# Patient Record
Sex: MALE | Race: WHITE | NOT HISPANIC OR LATINO | Employment: FULL TIME | URBAN - METROPOLITAN AREA
[De-identification: names, ages, dates, MRNs, and addresses within clinical notes are randomized per-mention and may not be internally consistent; named-entity substitution may affect disease eponyms.]

---

## 2019-05-17 ENCOUNTER — OFFICE VISIT (OUTPATIENT)
Dept: INTERNAL MEDICINE | Facility: CLINIC | Age: 59
End: 2019-05-17

## 2019-05-17 ENCOUNTER — TELEPHONE (OUTPATIENT)
Dept: INTERNAL MEDICINE | Facility: CLINIC | Age: 59
End: 2019-05-17

## 2019-05-17 VITALS
RESPIRATION RATE: 18 BRPM | TEMPERATURE: 97.8 F | SYSTOLIC BLOOD PRESSURE: 150 MMHG | HEART RATE: 62 BPM | WEIGHT: 206.6 LBS | HEIGHT: 70 IN | DIASTOLIC BLOOD PRESSURE: 90 MMHG | BODY MASS INDEX: 29.58 KG/M2

## 2019-05-17 DIAGNOSIS — Z23 NEED FOR VACCINATION: ICD-10-CM

## 2019-05-17 DIAGNOSIS — R73.09 ELEVATED HEMOGLOBIN A1C: ICD-10-CM

## 2019-05-17 DIAGNOSIS — G89.29 CHRONIC NECK PAIN: ICD-10-CM

## 2019-05-17 DIAGNOSIS — Z12.11 ENCOUNTER FOR SCREENING COLONOSCOPY: ICD-10-CM

## 2019-05-17 DIAGNOSIS — Z11.59 NEED FOR HEPATITIS C SCREENING TEST: ICD-10-CM

## 2019-05-17 DIAGNOSIS — Z13.1 SCREENING FOR DIABETES MELLITUS: ICD-10-CM

## 2019-05-17 DIAGNOSIS — Z13.220 SCREENING FOR LIPOID DISORDERS: ICD-10-CM

## 2019-05-17 DIAGNOSIS — M54.2 CHRONIC NECK PAIN: ICD-10-CM

## 2019-05-17 DIAGNOSIS — I10 ESSENTIAL HYPERTENSION: Primary | ICD-10-CM

## 2019-05-17 DIAGNOSIS — R06.83 SNORING: ICD-10-CM

## 2019-05-17 LAB
A/C: NORMAL
ALBUMIN SERPL-MCNC: 4.4 G/DL (ref 3.5–5.2)
ALBUMIN/GLOB SERPL: 1.9 G/DL
ALP SERPL-CCNC: 62 U/L (ref 39–117)
ALT SERPL W P-5'-P-CCNC: 36 U/L (ref 1–41)
ANION GAP SERPL CALCULATED.3IONS-SCNC: 9.9 MMOL/L
AST SERPL-CCNC: 19 U/L (ref 1–40)
BASOPHILS # BLD AUTO: 0.03 10*3/MM3 (ref 0–0.2)
BASOPHILS NFR BLD AUTO: 0.5 % (ref 0–1.5)
BILIRUB BLD-MCNC: NEGATIVE MG/DL
BILIRUB SERPL-MCNC: 1.1 MG/DL (ref 0.2–1.2)
BUN BLD-MCNC: 12 MG/DL (ref 8–23)
BUN/CREAT SERPL: 11.8 (ref 7–25)
CALCIUM SPEC-SCNC: 9.4 MG/DL (ref 8.6–10.5)
CHLORIDE SERPL-SCNC: 100 MMOL/L (ref 98–107)
CHOLEST SERPL-MCNC: 186 MG/DL (ref 0–200)
CLARITY, POC: CLEAR
CO2 SERPL-SCNC: 28.1 MMOL/L (ref 22–29)
COLOR UR: YELLOW
CREAT BLD-MCNC: 1.02 MG/DL (ref 0.76–1.27)
DEPRECATED RDW RBC AUTO: 40.5 FL (ref 37–54)
EOSINOPHIL # BLD AUTO: 0.1 10*3/MM3 (ref 0–0.4)
EOSINOPHIL NFR BLD AUTO: 1.8 % (ref 0.3–6.2)
ERYTHROCYTE [DISTWIDTH] IN BLOOD BY AUTOMATED COUNT: 11.9 % (ref 12.3–15.4)
EXPIRATION DATE: ABNORMAL
EXPIRATION DATE: NORMAL
EXPIRATION DATE: NORMAL
GFR SERPL CREATININE-BSD FRML MDRD: 74 ML/MIN/1.73
GLOBULIN UR ELPH-MCNC: 2.3 GM/DL
GLUCOSE BLD-MCNC: 421 MG/DL (ref 65–99)
GLUCOSE UR STRIP-MCNC: ABNORMAL MG/DL
HBA1C MFR BLD: 10.9 %
HCT VFR BLD AUTO: 48.5 % (ref 37.5–51)
HCV AB SER DONR QL: NORMAL
HDLC SERPL-MCNC: 51 MG/DL (ref 40–60)
HGB BLD-MCNC: 16.1 G/DL (ref 13–17.7)
IMM GRANULOCYTES # BLD AUTO: 0.02 10*3/MM3 (ref 0–0.05)
IMM GRANULOCYTES NFR BLD AUTO: 0.4 % (ref 0–0.5)
KETONES UR QL: NEGATIVE
LDLC SERPL CALC-MCNC: 108 MG/DL (ref 0–100)
LDLC/HDLC SERPL: 2.12 {RATIO}
LEUKOCYTE EST, POC: NEGATIVE
LYMPHOCYTES # BLD AUTO: 1.26 10*3/MM3 (ref 0.7–3.1)
LYMPHOCYTES NFR BLD AUTO: 22.7 % (ref 19.6–45.3)
Lab: ABNORMAL
Lab: NORMAL
Lab: NORMAL
MCH RBC QN AUTO: 30.8 PG (ref 26.6–33)
MCHC RBC AUTO-ENTMCNC: 33.2 G/DL (ref 31.5–35.7)
MCV RBC AUTO: 92.7 FL (ref 79–97)
MONOCYTES # BLD AUTO: 0.43 10*3/MM3 (ref 0.1–0.9)
MONOCYTES NFR BLD AUTO: 7.8 % (ref 5–12)
NEUTROPHILS # BLD AUTO: 3.7 10*3/MM3 (ref 1.7–7)
NEUTROPHILS NFR BLD AUTO: 66.8 % (ref 42.7–76)
NITRITE UR-MCNC: NEGATIVE MG/ML
NRBC BLD AUTO-RTO: 0 /100 WBC (ref 0–0.2)
PH UR: 5 [PH] (ref 5–8)
PLATELET # BLD AUTO: 233 10*3/MM3 (ref 140–450)
PMV BLD AUTO: 10.3 FL (ref 6–12)
POC CREATININE URINE: 100
POC MICROALBUMIN URINE: 10
POTASSIUM BLD-SCNC: 4.6 MMOL/L (ref 3.5–5.2)
PROT SERPL-MCNC: 6.7 G/DL (ref 6–8.5)
PROT UR STRIP-MCNC: NEGATIVE MG/DL
RBC # BLD AUTO: 5.23 10*6/MM3 (ref 4.14–5.8)
RBC # UR STRIP: NEGATIVE /UL
SODIUM BLD-SCNC: 138 MMOL/L (ref 136–145)
SP GR UR: 1.01 (ref 1–1.03)
T4 FREE SERPL-MCNC: 1.3 NG/DL (ref 0.93–1.7)
TRIGL SERPL-MCNC: 135 MG/DL (ref 0–150)
TSH SERPL DL<=0.05 MIU/L-ACNC: 1.36 MIU/ML (ref 0.27–4.2)
UROBILINOGEN UR QL: NORMAL
VLDLC SERPL-MCNC: 27 MG/DL (ref 5–40)
WBC NRBC COR # BLD: 5.54 10*3/MM3 (ref 3.4–10.8)

## 2019-05-17 PROCEDURE — 36415 COLL VENOUS BLD VENIPUNCTURE: CPT | Performed by: NURSE PRACTITIONER

## 2019-05-17 PROCEDURE — 80053 COMPREHEN METABOLIC PANEL: CPT | Performed by: NURSE PRACTITIONER

## 2019-05-17 PROCEDURE — 99204 OFFICE O/P NEW MOD 45 MIN: CPT | Performed by: NURSE PRACTITIONER

## 2019-05-17 PROCEDURE — 83036 HEMOGLOBIN GLYCOSYLATED A1C: CPT | Performed by: NURSE PRACTITIONER

## 2019-05-17 PROCEDURE — 85025 COMPLETE CBC W/AUTO DIFF WBC: CPT | Performed by: NURSE PRACTITIONER

## 2019-05-17 PROCEDURE — 84443 ASSAY THYROID STIM HORMONE: CPT | Performed by: NURSE PRACTITIONER

## 2019-05-17 PROCEDURE — 82044 UR ALBUMIN SEMIQUANTITATIVE: CPT | Performed by: NURSE PRACTITIONER

## 2019-05-17 PROCEDURE — 90471 IMMUNIZATION ADMIN: CPT | Performed by: NURSE PRACTITIONER

## 2019-05-17 PROCEDURE — 81003 URINALYSIS AUTO W/O SCOPE: CPT | Performed by: NURSE PRACTITIONER

## 2019-05-17 PROCEDURE — 86803 HEPATITIS C AB TEST: CPT | Performed by: NURSE PRACTITIONER

## 2019-05-17 PROCEDURE — 80061 LIPID PANEL: CPT | Performed by: NURSE PRACTITIONER

## 2019-05-17 PROCEDURE — 90715 TDAP VACCINE 7 YRS/> IM: CPT | Performed by: NURSE PRACTITIONER

## 2019-05-17 PROCEDURE — 84439 ASSAY OF FREE THYROXINE: CPT | Performed by: NURSE PRACTITIONER

## 2019-05-17 RX ORDER — LOSARTAN POTASSIUM 50 MG/1
50 TABLET ORAL DAILY
COMMUNITY
End: 2019-05-17 | Stop reason: DRUGHIGH

## 2019-05-17 RX ORDER — LOSARTAN POTASSIUM 100 MG/1
100 TABLET ORAL DAILY
Qty: 30 TABLET | Refills: 3 | Status: SHIPPED | OUTPATIENT
Start: 2019-05-17 | End: 2019-09-05 | Stop reason: SDUPTHER

## 2019-05-17 NOTE — TELEPHONE ENCOUNTER
"On call, [facility] lab called with \"Critical\" value of glucose 421. Reviewed PCP note from earlier today. Pt presented to establish care, previously know diabetic and A1c 10.1 when checked in office. PCP documented risks of not monitoring blood sugars and looks to have started Januvia after much discussion. Will defer to PCP on Monday to f/u further as pt seemed otherwise stable.    "
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
## 2019-05-18 DIAGNOSIS — E11.65 UNCONTROLLED TYPE 2 DIABETES MELLITUS WITH HYPERGLYCEMIA (HCC): Primary | ICD-10-CM

## 2019-05-18 RX ORDER — INSULIN GLARGINE 100 [IU]/ML
10 INJECTION, SOLUTION SUBCUTANEOUS DAILY
Qty: 1 PEN | Refills: 3 | Status: SHIPPED | OUTPATIENT
Start: 2019-05-18 | End: 2019-05-30 | Stop reason: ALTCHOICE

## 2019-05-30 ENCOUNTER — OFFICE VISIT (OUTPATIENT)
Dept: ENDOCRINOLOGY | Facility: CLINIC | Age: 59
End: 2019-05-30

## 2019-05-30 VITALS
SYSTOLIC BLOOD PRESSURE: 162 MMHG | OXYGEN SATURATION: 98 % | BODY MASS INDEX: 29.56 KG/M2 | WEIGHT: 206 LBS | DIASTOLIC BLOOD PRESSURE: 90 MMHG | HEART RATE: 73 BPM

## 2019-05-30 DIAGNOSIS — E11.65 UNCONTROLLED TYPE 2 DIABETES MELLITUS WITH HYPERGLYCEMIA (HCC): Primary | ICD-10-CM

## 2019-05-30 LAB — GLUCOSE BLDC GLUCOMTR-MCNC: 325 MG/DL (ref 70–130)

## 2019-05-30 PROCEDURE — 99205 OFFICE O/P NEW HI 60 MIN: CPT | Performed by: PHYSICIAN ASSISTANT

## 2019-05-30 PROCEDURE — 82947 ASSAY GLUCOSE BLOOD QUANT: CPT | Performed by: PHYSICIAN ASSISTANT

## 2019-05-30 NOTE — PROGRESS NOTES
"Chief Complaint  Establish care for Diabetes Mellitus.    HPI   Sancho Salazar is a 59 y.o. male who is here today for evaluation of Diabetes Mellitus type 2. The initial diagnosis of diabetes was made 2016.    Lost 20# over the last year.   Significant fam hx of type 2 diabetes, daughter has type 1.       A1C- 10.9 (5/17/19)    Diabetic complications: none  Eye exam current (within one year): due  Foot care and dental care: discussed    Current diabetic medications include:  Januvia 25mg daily  Basaglar   Has not started either of these. Sounds like PA required for both.     Statin: no    Past medications: metformin (severe abdominal pain)    Diabetic Monitoring  -   BS log reviewed- 2 days of readings fasting and 2h pp all mostly 200s    Nutrition:     Current diet: in general, an \"unhealthy\" diet, on average, 2-3 meals per day  Current exercise: none, sedentary job  Seen RD in past: no    The following portions of the patient's history were reviewed and updated by me as appropriate: allergies, current medications, past family history, past social history, past surgical history and problem list.    Past Medical History:   Diagnosis Date   • Hypertension        Medications    Current Outpatient Medications:   •  losartan (COZAAR) 100 MG tablet, Take 1 tablet by mouth Daily., Disp: 30 tablet, Rfl: 3  •  Empagliflozin (JARDIANCE) 25 MG tablet, Take 25 mg by mouth Daily., Disp: 90 tablet, Rfl: 1  •  Insulin Glargine (TOUJEO MAX SOLOSTAR) 300 UNIT/ML solution pen-injector, Inject 30 Units under the skin into the appropriate area as directed every night at bedtime., Disp: 3 pen, Rfl: 1  •  Insulin Pen Needle 32G X 6 MM misc, Use daily with insulin, Disp: 150 each, Rfl: 3    Review of Systems  Review of Systems   Constitutional: Negative for chills, fatigue, fever and unexpected weight change.   HENT: Negative for ear discharge, ear pain, hearing loss, nosebleeds, rhinorrhea and sore throat.    Eyes: Negative for pain, " redness and visual disturbance.   Respiratory: Negative for cough, shortness of breath and wheezing.    Cardiovascular: Negative for chest pain, palpitations and leg swelling.   Gastrointestinal: Negative for abdominal pain, blood in stool, constipation, diarrhea, nausea and vomiting.   Endocrine: Negative for cold intolerance, heat intolerance and polydipsia.   Genitourinary: Negative for difficulty urinating, discharge, dysuria, hematuria, penile pain, penile swelling, scrotal swelling, testicular pain and urgency.   Musculoskeletal: Positive for arthralgias. Negative for gait problem, joint swelling and myalgias.   Skin: Negative for rash.   Allergic/Immunologic: Negative.    Neurological: Negative for dizziness, syncope, weakness, numbness and headaches.   Hematological: Negative for adenopathy. Does not bruise/bleed easily.   Psychiatric/Behavioral: Negative for sleep disturbance and suicidal ideas. The patient is not nervous/anxious.         Physical Exam    /90   Pulse 73   Wt 93.4 kg (206 lb)   SpO2 98%   BMI 29.56 kg/m² Body mass index is 29.56 kg/m².  Physical Exam   Constitutional: He is oriented to person, place, and time. He appears well-developed. No distress.   HENT:   Head: Normocephalic.   Right Ear: External ear normal.   Left Ear: External ear normal.   Nose: Nose normal.   Eyes: Conjunctivae are normal. Right eye exhibits no discharge. Left eye exhibits no discharge. No scleral icterus.   Neck: Neck supple. No JVD present. No tracheal deviation present. No thyromegaly present.   Cardiovascular: Normal rate, regular rhythm, normal heart sounds and intact distal pulses.   No murmur heard.  Pulmonary/Chest: Effort normal and breath sounds normal. No respiratory distress. He has no wheezes.   Abdominal: Soft. Bowel sounds are normal. There is no tenderness.   Musculoskeletal: He exhibits no edema or tenderness.    Sancho had a diabetic foot exam performed today.   During the foot exam he had  a monofilament test performed.    Neurological Sensory Findings -  Unaltered sharp/dull right ankle/foot discrimination and unaltered sharp/dull left ankle/foot discrimination.  Vascular Status -  His right foot exhibits normal foot vasculature  and no edema. His left foot exhibits normal foot vasculature  and no edema.  Skin Integrity  -  His right foot skin is intact.  He has no right foot onychomycosis, no right foot ulcer and non-callous right foot.His left foot skin is intact. He has no left foot onychomycosis, no left foot ulcer and non-callous left foot..  Neurological: He is alert and oriented to person, place, and time.   Skin: Skin is warm and dry. No rash noted. He is not diaphoretic. No erythema.   Psychiatric: He has a normal mood and affect. His behavior is normal. Judgment and thought content normal.       Labs and Imaging   Lab Results   Component Value Date    HGBA1C 10.9 05/17/2019     Office Visit on 05/30/2019   Component Date Value Ref Range Status   • Glucose 05/30/2019 325* 70 - 130 mg/dL Final   Office Visit on 05/17/2019   Component Date Value Ref Range Status   • Free T4 05/17/2019 1.30  0.93 - 1.70 ng/dL Final   • TSH 05/17/2019 1.360  0.270 - 4.200 mIU/mL Final   • Glucose 05/17/2019 421* 65 - 99 mg/dL Final   • BUN 05/17/2019 12  8 - 23 mg/dL Final   • Creatinine 05/17/2019 1.02  0.76 - 1.27 mg/dL Final   • Sodium 05/17/2019 138  136 - 145 mmol/L Final   • Potassium 05/17/2019 4.6  3.5 - 5.2 mmol/L Final   • Chloride 05/17/2019 100  98 - 107 mmol/L Final   • CO2 05/17/2019 28.1  22.0 - 29.0 mmol/L Final   • Calcium 05/17/2019 9.4  8.6 - 10.5 mg/dL Final   • Total Protein 05/17/2019 6.7  6.0 - 8.5 g/dL Final   • Albumin 05/17/2019 4.40  3.50 - 5.20 g/dL Final   • ALT (SGPT) 05/17/2019 36  1 - 41 U/L Final   • AST (SGOT) 05/17/2019 19  1 - 40 U/L Final   • Alkaline Phosphatase 05/17/2019 62  39 - 117 U/L Final   • Total Bilirubin 05/17/2019 1.1  0.2 - 1.2 mg/dL Final   • eGFR Non African  Amer 05/17/2019 74  >60 mL/min/1.73 Final   • Globulin 05/17/2019 2.3  gm/dL Final   • A/G Ratio 05/17/2019 1.9  g/dL Final   • BUN/Creatinine Ratio 05/17/2019 11.8  7.0 - 25.0 Final   • Anion Gap 05/17/2019 9.9  mmol/L Final   • Total Cholesterol 05/17/2019 186  0 - 200 mg/dL Final   • Triglycerides 05/17/2019 135  0 - 150 mg/dL Final   • HDL Cholesterol 05/17/2019 51  40 - 60 mg/dL Final   • LDL Cholesterol  05/17/2019 108* 0 - 100 mg/dL Final   • VLDL Cholesterol 05/17/2019 27  5 - 40 mg/dL Final   • LDL/HDL Ratio 05/17/2019 2.12   Final   • Hemoglobin A1C 05/17/2019 10.9  % Final   • Lot Number 05/17/2019 [id number]   Final   • Expiration Date 05/17/2019 2-27-21   Final   • Microalbumin, Urine 05/17/2019 10   Final   • Creatinine, Urine 05/17/2019 100   Final   • A/C 05/17/2019 <30mg/g NORMAL   Final   • Lot Number 05/17/2019 [id number]   Final   • Expiration Date 05/17/2019 2-29-20   Final   • Color 05/17/2019 Yellow  Yellow, Straw, Dark Yellow, Zoila Final   • Clarity, UA 05/17/2019 Clear  Clear Final   • Specific Gravity  05/17/2019 1.015  1.005 - 1.030 Final   • pH, Urine 05/17/2019 5.0  5.0 - 8.0 Final   • Leukocytes 05/17/2019 Negative  Negative Final   • Nitrite, UA 05/17/2019 Negative  Negative Final   • Protein, POC 05/17/2019 Negative  Negative mg/dL Final   • Glucose, UA 05/17/2019 >=1000 mg/dL (3+)* Negative, 1000 mg/dL (3+) mg/dL Final   • Ketones, UA 05/17/2019 Negative  Negative Final   • Urobilinogen, UA 05/17/2019 Normal  Normal Final   • Bilirubin 05/17/2019 Negative  Negative Final   • Blood, UA 05/17/2019 Negative  Negative Final   • Lot Number 05/17/2019 [id number]   Final   • Expiration Date 05/17/2019 1-31-20   Final   • Hepatitis C Ab 05/17/2019 Non-Reactive  Non-Reactive Final   • WBC 05/17/2019 5.54  3.40 - 10.80 10*3/mm3 Final   • RBC 05/17/2019 5.23  4.14 - 5.80 10*6/mm3 Final   • Hemoglobin 05/17/2019 16.1  13.0 - 17.7 g/dL Final   • Hematocrit 05/17/2019 48.5  37.5 - 51.0 % Final   •  MCV 05/17/2019 92.7  79.0 - 97.0 fL Final   • MCH 05/17/2019 30.8  26.6 - 33.0 pg Final   • MCHC 05/17/2019 33.2  31.5 - 35.7 g/dL Final   • RDW 05/17/2019 11.9* 12.3 - 15.4 % Final   • RDW-SD 05/17/2019 40.5  37.0 - 54.0 fl Final   • MPV 05/17/2019 10.3  6.0 - 12.0 fL Final   • Platelets 05/17/2019 233  140 - 450 10*3/mm3 Final   • Neutrophil % 05/17/2019 66.8  42.7 - 76.0 % Final   • Lymphocyte % 05/17/2019 22.7  19.6 - 45.3 % Final   • Monocyte % 05/17/2019 7.8  5.0 - 12.0 % Final   • Eosinophil % 05/17/2019 1.8  0.3 - 6.2 % Final   • Basophil % 05/17/2019 0.5  0.0 - 1.5 % Final   • Immature Grans % 05/17/2019 0.4  0.0 - 0.5 % Final   • Neutrophils, Absolute 05/17/2019 3.70  1.70 - 7.00 10*3/mm3 Final   • Lymphocytes, Absolute 05/17/2019 1.26  0.70 - 3.10 10*3/mm3 Final   • Monocytes, Absolute 05/17/2019 0.43  0.10 - 0.90 10*3/mm3 Final   • Eosinophils, Absolute 05/17/2019 0.10  0.00 - 0.40 10*3/mm3 Final   • Basophils, Absolute 05/17/2019 0.03  0.00 - 0.20 10*3/mm3 Final   • Immature Grans, Absolute 05/17/2019 0.02  0.00 - 0.05 10*3/mm3 Final   • nRBC 05/17/2019 0.0  0.0 - 0.2 /100 WBC Final     No images are attached to the encounter or orders placed in the encounter.  No Images in the past 120 days found..    Assessment / Plan   Sancho was seen today for establish care.    Diagnoses and all orders for this visit:    Uncontrolled type 2 diabetes mellitus with hyperglycemia (CMS/[facility])  -     POC Glucose Fingerstick  -     Empagliflozin (JARDIANCE) 25 MG tablet; Take 25 mg by mouth Daily.  -     Insulin Glargine (TOUJEO MAX SOLOSTAR) 300 UNIT/ML solution pen-injector; Inject 30 Units under the skin into the appropriate area as directed every night at bedtime.  -     Insulin Pen Needle 32G X 6 MM misc; Use daily with insulin      Diabetes Mellitus 2 is under poor control.  -A1c 10.9  -sedentary job  -currently on no dm medication. has not picked up januvia or basaglar- sounds like it needs a PA  -long discussion  about diabetes management, A1C goal, risks on uncontrolled diabetes, pros and cons of individual medications  -discussed diet. Literature provided.  -exercise encouraged.  -start jardiance 25mg daily. Samples x 3 months and copay card provided. Discussed importance of adequate hydration and good hygiene with this medication.   -start toujeo max 20u qhs, in 1 week 25u, in 2 weeks 30u. 4 pens provided. Reviewed administration.   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 Today is Thursday and I am not here tomorrow, therefore I have given him enough samples of Jardiance and Toujeo to last x 3 months in case these are not covered by his insurance or a PA is needed.     1.  Diet: 3-4 carb servings per meal for females, 4-5 carb servings per meal for males  Spread carb intake throughout the day  Increase lean protein and vegetable intake  Avoid sugary drinks and processed carbs including crackers, cookies, cakes  2.  Exercise: Recommend at least 30 minutes of exercise daily, at least 5 days per week. Increase exercise gradually.   3.  Blood Glucose Goal: Blood glucose goal <150 fasting, <180 2 hr postprandial  4.  Microalbumin due 5/2020  5.  Education performed during this visit: long term diabetic complications discussed. , annual eye examinations at Ophthalmology discussed, dental hygiene discussed  and foot care reviewed., home glucose monitoring emphasized, all medications, side effects and compliance discussed carefully and Hypoglycemia management and prevention reviewed. Reviewed ‘ABCs’ of diabetes management (respective goals in parentheses):  A1C (<7), blood pressure (<130/80), and cholesterol (LDL <100, if CVD <70).    Patient Instructions   Start Jardiance 25mg daily.   Start Toujeo insulin 20 units at bedtime. In 1 week increase to 25 units, in 2 weeks increase to 30 units.  Check blood sugar fasting in the morning and at  bedtime.  Bring meter or blood sugar log to all follow up.       Follow up: Return in about 3 months (around 8/30/2019).    Discussed the nature of the disease including, risks, complications, implications, management, safe and proper use of medications. Encouraged therapeutic lifestyle changes including low calorie diet with plenty of fruits and vegetables, daily exercise, medication compliance, and keeping scheduled follow up appointments with me and any other providers. Encouraged patient to have appointment for complete physical, fasting labs, appropriate screenings, and immunizations on an annual basis.    39 min  of 60 min face-to-face visit time spent for coordination of care and counselling regarding identified problems as outlined in the objective, assessment and discussion portions of the documentation.    Javier Jewell PA-C

## 2019-05-30 NOTE — PATIENT INSTRUCTIONS
Start Jardiance 25mg daily.   Start Toujeo insulin 20 units at bedtime. In 1 week increase to 25 units, in 2 weeks increase to 30 units.  Check blood sugar fasting in the morning and at bedtime.  Bring meter or blood sugar log to all follow up.

## 2019-06-03 ENCOUNTER — TELEPHONE (OUTPATIENT)
Dept: INTERNAL MEDICINE | Facility: CLINIC | Age: 59
End: 2019-06-03

## 2019-06-03 NOTE — TELEPHONE ENCOUNTER
PHARMACY CALLED AND STATED THAT THE NEEDLES THAT WERE SENT OVER, THEY DO NOT CARRY THAT SIZE; THEY WANT TO KNOW IF THEY CAN USE THE BD MINI NEEDLES?  THEY STATED THAT THE INSURANCE PREFERS THESE AS WELL.

## 2019-06-07 ENCOUNTER — PRIOR AUTHORIZATION (OUTPATIENT)
Dept: ENDOCRINOLOGY | Facility: CLINIC | Age: 59
End: 2019-06-07

## 2019-06-07 DIAGNOSIS — E11.65 UNCONTROLLED TYPE 2 DIABETES MELLITUS WITH HYPERGLYCEMIA (HCC): ICD-10-CM

## 2019-08-09 ENCOUNTER — TELEPHONE (OUTPATIENT)
Dept: INTERNAL MEDICINE | Facility: CLINIC | Age: 59
End: 2019-08-09

## 2019-08-09 NOTE — TELEPHONE ENCOUNTER
PATIENT IS NEEDING TO RESCHEDULE HIS APPOINTMENT WITH [facility]. HE NEEDS TO SEE IF WE CAN SCHEDULE HIM BETWEEN THE DATES OF 8/15/19-8/20/19. PATIENTS NUMBER [id number]

## 2019-08-16 ENCOUNTER — OFFICE VISIT (OUTPATIENT)
Dept: ENDOCRINOLOGY | Facility: CLINIC | Age: 59
End: 2019-08-16

## 2019-08-16 VITALS
OXYGEN SATURATION: 98 % | DIASTOLIC BLOOD PRESSURE: 84 MMHG | SYSTOLIC BLOOD PRESSURE: 142 MMHG | HEART RATE: 86 BPM | BODY MASS INDEX: 28.98 KG/M2 | WEIGHT: 202 LBS

## 2019-08-16 DIAGNOSIS — R39.9 UTI SYMPTOMS: ICD-10-CM

## 2019-08-16 DIAGNOSIS — R25.1 TREMOR OF BOTH HANDS: ICD-10-CM

## 2019-08-16 DIAGNOSIS — R73.09 ELEVATED HEMOGLOBIN A1C: Primary | ICD-10-CM

## 2019-08-16 DIAGNOSIS — E11.65 UNCONTROLLED TYPE 2 DIABETES MELLITUS WITH HYPERGLYCEMIA (HCC): ICD-10-CM

## 2019-08-16 LAB
BILIRUB BLD-MCNC: NEGATIVE MG/DL
CLARITY, POC: CLEAR
COLOR UR: YELLOW
GLUCOSE BLDC GLUCOMTR-MCNC: 131 MG/DL (ref 70–130)
GLUCOSE UR STRIP-MCNC: ABNORMAL MG/DL
HBA1C MFR BLD: 6.5 %
KETONES UR QL: NEGATIVE
LEUKOCYTE EST, POC: NEGATIVE
NITRITE UR-MCNC: NEGATIVE MG/ML
PH UR: 5 [PH] (ref 5–8)
PROT UR STRIP-MCNC: NEGATIVE MG/DL
RBC # UR STRIP: NEGATIVE /UL
SP GR UR: 1.01 (ref 1–1.03)
T4 FREE SERPL-MCNC: 1.16 NG/DL (ref 0.93–1.7)
TSH SERPL DL<=0.05 MIU/L-ACNC: 1.01 MIU/ML (ref 0.27–4.2)
UROBILINOGEN UR QL: NORMAL

## 2019-08-16 PROCEDURE — 81003 URINALYSIS AUTO W/O SCOPE: CPT | Performed by: PHYSICIAN ASSISTANT

## 2019-08-16 PROCEDURE — 99214 OFFICE O/P EST MOD 30 MIN: CPT | Performed by: PHYSICIAN ASSISTANT

## 2019-08-16 PROCEDURE — 84443 ASSAY THYROID STIM HORMONE: CPT | Performed by: PHYSICIAN ASSISTANT

## 2019-08-16 PROCEDURE — 82947 ASSAY GLUCOSE BLOOD QUANT: CPT | Performed by: PHYSICIAN ASSISTANT

## 2019-08-16 PROCEDURE — 84439 ASSAY OF FREE THYROXINE: CPT | Performed by: PHYSICIAN ASSISTANT

## 2019-08-16 PROCEDURE — 83036 HEMOGLOBIN GLYCOSYLATED A1C: CPT | Performed by: PHYSICIAN ASSISTANT

## 2019-08-16 NOTE — PROGRESS NOTES
"Chief Complaint  F/u for Diabetes Mellitus.    HPI   Sancho Salazar is a 59 y.o. male who is here today for f/u of Diabetes Mellitus type 2. The initial diagnosis of diabetes was made 2016.    Eating better. Following plate method.  Has some sx of UTI. Some abdominal pressure in the bladder area.   Not hydrating well.   Significant fam hx of type 2 diabetes, daughter has type 1.     A1C- 6.5 (8/16/19), 10.9 (5/17/19)    Diabetic complications: none  Eye exam current (within one year): due  Foot care and dental care: discussed    Current diabetic medications include:  Jardiance 25mg daily  Toujeo 25u qhs. Had hypoglycemia with higher dose    Statin: no    Past medications: metformin (severe abdominal pain)    Diabetic Monitoring  -   BS log reviewed- fbs 90-120s, at times fasting in the 60s per pt report, during the day readings mostly 160s, occasionally ~200    Nutrition:     Current diet: in general, an \"unhealthy\" diet, on average, 2-3 meals per day  Current exercise: none, sedentary job  Seen RD in past: no    The following portions of the patient's history were reviewed and updated by me as appropriate: allergies, current medications, past family history, past social history, past surgical history and problem list.    Past Medical History:   Diagnosis Date   • Hypertension        Medications    Current Outpatient Medications:   •  Empagliflozin (JARDIANCE) 25 MG tablet, Take 25 mg by mouth Daily., Disp: 90 tablet, Rfl: 1  •  Insulin Glargine (TOUJEO MAX SOLOSTAR) 300 UNIT/ML solution pen-injector, Inject 23 Units under the skin into the appropriate area as directed every night at bedtime., Disp: 3 pen, Rfl: 1  •  Insulin Pen Needle 31G X 5 MM misc, Use daily with insulin injections, Disp: 150 each, Rfl: 12  •  losartan (COZAAR) 100 MG tablet, Take 1 tablet by mouth Daily., Disp: 30 tablet, Rfl: 3    Review of Systems  Review of Systems   Constitutional: Negative for chills, fatigue, fever and unexpected weight " change.   HENT: Negative for ear discharge, ear pain, hearing loss, nosebleeds, rhinorrhea and sore throat.    Eyes: Negative for pain, redness and visual disturbance.   Respiratory: Negative for cough, shortness of breath and wheezing.    Cardiovascular: Negative for chest pain, palpitations and leg swelling.   Gastrointestinal: Negative for abdominal pain, blood in stool, constipation, diarrhea, nausea and vomiting.   Endocrine: Negative for cold intolerance, heat intolerance and polydipsia.   Genitourinary: Negative for difficulty urinating, discharge, dysuria, hematuria, penile pain, penile swelling, scrotal swelling, testicular pain and urgency.   Musculoskeletal: Positive for arthralgias. Negative for gait problem, joint swelling and myalgias.   Skin: Negative for rash.   Allergic/Immunologic: Negative.    Neurological: Negative for dizziness, syncope, weakness, numbness and headaches.   Hematological: Negative for adenopathy. Does not bruise/bleed easily.   Psychiatric/Behavioral: Negative for sleep disturbance and suicidal ideas. The patient is not nervous/anxious.         Physical Exam    /84   Pulse 86   Wt 91.6 kg (202 lb)   SpO2 98%   BMI 28.98 kg/m² Body mass index is 28.98 kg/m².  Physical Exam   Constitutional: He is oriented to person, place, and time. He appears well-developed. No distress.   HENT:   Head: Normocephalic.   Right Ear: External ear normal.   Left Ear: External ear normal.   Nose: Nose normal.   Eyes: Conjunctivae are normal. Right eye exhibits no discharge. Left eye exhibits no discharge. No scleral icterus.   Neck: Neck supple. No JVD present. No tracheal deviation present. No thyromegaly present.   Cardiovascular: Normal rate, regular rhythm, normal heart sounds and intact distal pulses.   No murmur heard.  Pulmonary/Chest: Effort normal and breath sounds normal. No respiratory distress. He has no wheezes.   Abdominal: Soft. Bowel sounds are normal. There is no tenderness.    Musculoskeletal: He exhibits no edema or tenderness.   Neurological: He is alert and oriented to person, place, and time.   Skin: Skin is warm and dry. No rash noted. He is not diaphoretic. No erythema.   Psychiatric: He has a normal mood and affect. His behavior is normal. Judgment and thought content normal.       Labs and Imaging   Lab Results   Component Value Date    HGBA1C 6.5 08/16/2019    HGBA1C 10.9 05/17/2019     Office Visit on 08/16/2019   Component Date Value Ref Range Status   • Glucose 08/16/2019 131* 70 - 130 mg/dL Final   • Hemoglobin A1C 08/16/2019 6.5  % Final     No images are attached to the encounter or orders placed in the encounter.  No Images in the past 120 days found..    Assessment / Plan   Sancho was seen today for follow-up.    Diagnoses and all orders for this visit:    Elevated hemoglobin A1c  -     POC Glucose Fingerstick  -     POC Glycosylated Hemoglobin (Hb A1C)    UTI symptoms  -     POC Urinalysis Dipstick, Automated    Uncontrolled type 2 diabetes mellitus with hyperglycemia (CMS/[facility])  -     Empagliflozin (JARDIANCE) 25 MG tablet; Take 25 mg by mouth Daily.  -     Insulin Glargine (TOUJEO MAX SOLOSTAR) 300 UNIT/ML solution pen-injector; Inject 23 Units under the skin into the appropriate area as directed every night at bedtime.    Tremor of both hands  -     TSH  -     T4, Free      Diabetes Mellitus 2 is under poor control.  -A1c 6.5, down from 10.9 5/2019  -some hypoglycemia in AM  -decrease toujeo to 23u qhs. Pt is content with current regimen and is ok to stay on insulin  -continue jardiance 25mg daily. Will check to see if he has a UTI. Discussed hydrating better. Consider decrease to 10mg.    1.  Diet: 3-4 carb servings per meal for females, 4-5 carb servings per meal for males  Spread carb intake throughout the day  Increase lean protein and vegetable intake  Avoid sugary drinks and processed carbs including crackers, cookies, cakes  2.  Exercise: Recommend at least 30  minutes of exercise daily, at least 5 days per week. Increase exercise gradually.   3.  Blood Glucose Goal: Blood glucose goal <150 fasting, <180 2 hr postprandial  4.  Microalbumin due 5/2020  5.  Education performed during this visit: long term diabetic complications discussed. , annual eye examinations at Ophthalmology discussed, dental hygiene discussed  and foot care reviewed., home glucose monitoring emphasized, all medications, side effects and compliance discussed carefully and Hypoglycemia management and prevention reviewed. Reviewed ‘ABCs’ of diabetes management (respective goals in parentheses):  A1C (<7), blood pressure (<130/80), and cholesterol (LDL <100, if CVD <70).    There are no Patient Instructions on file for this visit.    Follow up: Return in about 4 months (around 12/16/2019).    Discussed the nature of the disease including, risks, complications, implications, management, safe and proper use of medications. Encouraged therapeutic lifestyle changes including low calorie diet with plenty of fruits and vegetables, daily exercise, medication compliance, and keeping scheduled follow up appointments with me and any other providers. Encouraged patient to have appointment for complete physical, fasting labs, appropriate screenings, and immunizations on an annual basis.      Javier Jewell PA-C

## 2019-09-05 DIAGNOSIS — I10 ESSENTIAL HYPERTENSION: ICD-10-CM

## 2019-09-06 ENCOUNTER — TELEPHONE (OUTPATIENT)
Dept: INTERNAL MEDICINE | Facility: CLINIC | Age: 59
End: 2019-09-06

## 2019-09-06 RX ORDER — LOSARTAN POTASSIUM 100 MG/1
TABLET ORAL
Qty: 30 TABLET | Refills: 2 | Status: SHIPPED | OUTPATIENT
Start: 2019-09-06 | End: 2019-11-25 | Stop reason: SDUPTHER

## 2019-10-08 ENCOUNTER — TELEPHONE (OUTPATIENT)
Dept: INTERNAL MEDICINE | Facility: CLINIC | Age: 59
End: 2019-10-08

## 2019-10-08 NOTE — TELEPHONE ENCOUNTER
Patient was seen in the walk-in and the note was sent to me to ankita.  I see the patient is a prior patient of [facility]'s.  I see that I am now his PCP.  I do not see an appointment with me scheduled.  Please schedule appointment for this patient

## 2019-11-01 ENCOUNTER — OFFICE VISIT (OUTPATIENT)
Dept: INTERNAL MEDICINE | Facility: CLINIC | Age: 59
End: 2019-11-01

## 2019-11-01 VITALS
RESPIRATION RATE: 16 BRPM | OXYGEN SATURATION: 96 % | HEART RATE: 98 BPM | DIASTOLIC BLOOD PRESSURE: 80 MMHG | BODY MASS INDEX: 28.69 KG/M2 | TEMPERATURE: 98 F | WEIGHT: 200.38 LBS | HEIGHT: 70 IN | SYSTOLIC BLOOD PRESSURE: 130 MMHG

## 2019-11-01 DIAGNOSIS — E11.9 TYPE 2 DIABETES MELLITUS WITHOUT COMPLICATION, WITHOUT LONG-TERM CURRENT USE OF INSULIN (HCC): ICD-10-CM

## 2019-11-01 DIAGNOSIS — I10 ESSENTIAL HYPERTENSION: Primary | ICD-10-CM

## 2019-11-01 DIAGNOSIS — R06.83 SNORES: ICD-10-CM

## 2019-11-01 PROCEDURE — 99214 OFFICE O/P EST MOD 30 MIN: CPT | Performed by: NURSE PRACTITIONER

## 2019-11-01 RX ORDER — INSULIN GLARGINE 300 U/ML
INJECTION, SOLUTION SUBCUTANEOUS
COMMUNITY
Start: 2019-08-16 | End: 2020-01-31 | Stop reason: SDUPTHER

## 2019-11-01 RX ORDER — FLUCONAZOLE 150 MG/1
TABLET ORAL
COMMUNITY
Start: 2019-10-07 | End: 2019-11-01

## 2019-11-25 DIAGNOSIS — I10 ESSENTIAL HYPERTENSION: ICD-10-CM

## 2019-11-25 RX ORDER — LOSARTAN POTASSIUM 100 MG/1
100 TABLET ORAL DAILY
Qty: 30 TABLET | Refills: 2 | Status: SHIPPED | OUTPATIENT
Start: 2019-11-25 | End: 2020-02-26 | Stop reason: SDUPTHER

## 2019-12-01 PROBLEM — M54.2 CHRONIC NECK PAIN: Status: ACTIVE | Noted: 2019-12-01

## 2019-12-01 PROBLEM — G89.29 CHRONIC NECK PAIN: Status: ACTIVE | Noted: 2019-12-01

## 2019-12-01 PROBLEM — E11.9 TYPE 2 DIABETES MELLITUS WITHOUT COMPLICATION, WITHOUT LONG-TERM CURRENT USE OF INSULIN: Status: ACTIVE | Noted: 2019-05-17

## 2019-12-01 PROBLEM — R06.83 SNORES: Status: ACTIVE | Noted: 2019-12-01

## 2019-12-05 PROBLEM — D22.9 ATYPICAL NEVI: Status: ACTIVE | Noted: 2019-12-05

## 2019-12-17 ENCOUNTER — TELEPHONE (OUTPATIENT)
Dept: INTERNAL MEDICINE | Facility: CLINIC | Age: 59
End: 2019-12-17

## 2019-12-17 NOTE — TELEPHONE ENCOUNTER
Pt called because he forgot he had an appointment today.... He's in [place].   Please call him back at [phone] to reschedule.

## 2020-01-30 ENCOUNTER — TELEPHONE (OUTPATIENT)
Dept: ENDOCRINOLOGY | Facility: CLINIC | Age: 60
End: 2020-01-30

## 2020-01-30 NOTE — TELEPHONE ENCOUNTER
PT CALLED STATING THAT HIS INSULIN WAS DENIED AND WANTED TO KNOW IF WE COULD SEND OVER A NEW SCRIPT. PLEASE ADVISE THANKS

## 2020-01-31 DIAGNOSIS — E11.65 UNCONTROLLED TYPE 2 DIABETES MELLITUS WITH HYPERGLYCEMIA (HCC): ICD-10-CM

## 2020-01-31 RX ORDER — INSULIN GLARGINE 300 U/ML
23 INJECTION, SOLUTION SUBCUTANEOUS NIGHTLY
Qty: 1 PEN | Refills: 0 | Status: SHIPPED | OUTPATIENT
Start: 2020-01-31 | End: 2020-02-26

## 2020-02-26 ENCOUNTER — OFFICE VISIT (OUTPATIENT)
Dept: ENDOCRINOLOGY | Facility: CLINIC | Age: 60
End: 2020-02-26

## 2020-02-26 VITALS
BODY MASS INDEX: 29.53 KG/M2 | WEIGHT: 205.8 LBS | SYSTOLIC BLOOD PRESSURE: 132 MMHG | DIASTOLIC BLOOD PRESSURE: 90 MMHG | HEART RATE: 76 BPM | OXYGEN SATURATION: 99 %

## 2020-02-26 DIAGNOSIS — E11.649 TYPE 2 DIABETES MELLITUS WITH HYPOGLYCEMIA WITHOUT COMA, WITH LONG-TERM CURRENT USE OF INSULIN (HCC): Primary | ICD-10-CM

## 2020-02-26 DIAGNOSIS — Z79.4 TYPE 2 DIABETES MELLITUS WITH HYPOGLYCEMIA WITHOUT COMA, WITH LONG-TERM CURRENT USE OF INSULIN (HCC): Primary | ICD-10-CM

## 2020-02-26 DIAGNOSIS — I10 ESSENTIAL HYPERTENSION: ICD-10-CM

## 2020-02-26 LAB
GLUCOSE BLDC GLUCOMTR-MCNC: 83 MG/DL (ref 70–130)
HBA1C MFR BLD: 6.8 %

## 2020-02-26 PROCEDURE — 82947 ASSAY GLUCOSE BLOOD QUANT: CPT | Performed by: PHYSICIAN ASSISTANT

## 2020-02-26 PROCEDURE — 99214 OFFICE O/P EST MOD 30 MIN: CPT | Performed by: PHYSICIAN ASSISTANT

## 2020-02-26 PROCEDURE — 83036 HEMOGLOBIN GLYCOSYLATED A1C: CPT | Performed by: PHYSICIAN ASSISTANT

## 2020-02-26 RX ORDER — LOSARTAN POTASSIUM 100 MG/1
100 TABLET ORAL DAILY
Qty: 90 TABLET | Refills: 1 | Status: SHIPPED | OUTPATIENT
Start: 2020-02-26 | End: 2020-03-11 | Stop reason: SDUPTHER

## 2020-02-26 RX ORDER — ATORVASTATIN CALCIUM 40 MG/1
40 TABLET, FILM COATED ORAL DAILY
Qty: 90 TABLET | Refills: 1 | Status: SHIPPED | OUTPATIENT
Start: 2020-02-26 | End: 2020-03-11 | Stop reason: SDUPTHER

## 2020-02-26 NOTE — PROGRESS NOTES
"Chief Complaint  F/u for Diabetes Mellitus.    HPI   Sancho Salazar is a 60 y.o. male who is here today for f/u of Diabetes Mellitus type 2. The initial diagnosis of diabetes was made 2016.    Pt is doing well.  Recently came back from [place].  Had a severe sinus infection few weeks ago.     A1C- 6.8 (2/26/2020), 6.5 (8/16/19), 10.9 (5/17/19)    Diabetic complications: none  Eye exam current (within one year): due  Foot care and dental care: discussed    Current diabetic medications include:  Jardiance 25mg daily  Toujeo 23u qhs. Had hypoglycemia with higher dose    Statin: no    Past medications: metformin (severe abdominal pain)    Diabetic Monitoring  -   Meter downloaded and reviewed- he checks bs 1-2x/day with most readings <160. He reports occasional hypoglycemia (feels shaky in the 70s)    Nutrition:     Current diet: in general, an \"unhealthy\" diet, on average, 2-3 meals per day  Current exercise: none, sedentary job  Seen RD in past: no    The following portions of the patient's history were reviewed and updated by me as appropriate: allergies, current medications, past family history, past social history, past surgical history and problem list.    Past Medical History:   Diagnosis Date   • Diabetes mellitus (CMS/[facility])    • Hypertension        Medications    Current Outpatient Medications:   •  Empagliflozin (JARDIANCE) 25 MG tablet, Take 25 mg by mouth Daily., Disp: 90 tablet, Rfl: 1  •  Insulin Pen Needle 31G X 5 MM misc, Use daily with insulin injections, Disp: 150 each, Rfl: 12  •  losartan (COZAAR) 100 MG tablet, Take 1 tablet by mouth Daily., Disp: 90 tablet, Rfl: 1  •  atorvastatin (LIPITOR) 40 MG tablet, Take 1 tablet by mouth Daily., Disp: 90 tablet, Rfl: 1  •  Insulin Glargine, 1 Unit Dial, (TOUJEO SOLOSTAR) 300 UNIT/ML solution pen-injector injection, Inject 22 Units under the skin into the appropriate area as directed every night at bedtime., Disp: 6 pen, Rfl: 1    Review of Systems  Review of " Systems   Constitutional: Negative for chills, fatigue, fever and unexpected weight change.   HENT: Negative for ear discharge, ear pain, hearing loss, nosebleeds, rhinorrhea and sore throat.    Eyes: Negative for pain, redness and visual disturbance.   Respiratory: Negative for cough, shortness of breath and wheezing.    Cardiovascular: Negative for chest pain, palpitations and leg swelling.   Gastrointestinal: Negative for abdominal pain, blood in stool, constipation, diarrhea, nausea and vomiting.   Endocrine: Negative for cold intolerance, heat intolerance and polydipsia.   Genitourinary: Negative for difficulty urinating, discharge, dysuria, hematuria, penile pain, penile swelling, scrotal swelling, testicular pain and urgency.   Musculoskeletal: Positive for arthralgias. Negative for gait problem, joint swelling and myalgias.   Skin: Negative for rash.   Allergic/Immunologic: Negative.    Neurological: Negative for dizziness, syncope, weakness, numbness and headaches.   Hematological: Negative for adenopathy. Does not bruise/bleed easily.   Psychiatric/Behavioral: Negative for sleep disturbance and suicidal ideas. The patient is not nervous/anxious.         Physical Exam    /90   Pulse 76   Wt 93.4 kg (205 lb 12.8 oz)   SpO2 99%   BMI 29.53 kg/m² Body mass index is 29.53 kg/m².  Physical Exam   Constitutional: He is oriented to person, place, and time. He appears well-developed. No distress.   HENT:   Head: Normocephalic.   Right Ear: External ear normal.   Left Ear: External ear normal.   Nose: Nose normal.   Eyes: Conjunctivae are normal. Right eye exhibits no discharge. Left eye exhibits no discharge. No scleral icterus.   Neck: Neck supple. No JVD present. No tracheal deviation present. No thyromegaly present.   Cardiovascular: Normal rate, regular rhythm, normal heart sounds and intact distal pulses.   No murmur heard.  Pulmonary/Chest: Effort normal and breath sounds normal. No respiratory  distress. He has no wheezes.   Abdominal: Soft. Bowel sounds are normal. There is no tenderness.   Musculoskeletal: He exhibits no edema or tenderness.   Neurological: He is alert and oriented to person, place, and time.   Skin: Skin is warm and dry. No rash noted. He is not diaphoretic. No erythema.   Psychiatric: He has a normal mood and affect. His behavior is normal. Judgment and thought content normal.       Labs and Imaging   Lab Results   Component Value Date    HGBA1C 6.8 02/26/2020    HGBA1C 6.5 08/16/2019    HGBA1C 10.9 05/17/2019     Office Visit on 02/26/2020   Component Date Value Ref Range Status   • Glucose 02/26/2020 83  70 - 130 mg/dL Final   • Hemoglobin A1C 02/26/2020 6.8  % Final     No images are attached to the encounter or orders placed in the encounter.  No Images in the past 120 days found..    Assessment / Plan   Sancho was seen today for follow-up.    Diagnoses and all orders for this visit:    Type 2 diabetes mellitus with hypoglycemia without coma, with long-term current use of insulin (CMS/[facility])  -     POC Glucose Fingerstick  -     POC Glycosylated Hemoglobin (Hb A1C)  -     atorvastatin (LIPITOR) 40 MG tablet; Take 1 tablet by mouth Daily.  -     Empagliflozin (JARDIANCE) 25 MG tablet; Take 25 mg by mouth Daily.  -     Insulin Glargine, 1 Unit Dial, (TOUJEO SOLOSTAR) 300 UNIT/ML solution pen-injector injection; Inject 22 Units under the skin into the appropriate area as directed every night at bedtime.    Essential hypertension  -     losartan (COZAAR) 100 MG tablet; Take 1 tablet by mouth Daily.      Diabetes Mellitus 2 is under fair control.  -A1c 6.8  -with occasional hypoglycemia  -decrease toujeo to 22u qhs. Pt is content with current regimen and is ok to stay on insulin  -discussed considering dc insulin and trying weekly GLP1 agonist but he rather not change current regimen  -continue jardiance 25mg daily.   -discussed benefits of statins with diabetes. Sent lipitor 40mg qhs and  he will consider taking it.     1.  Diet: 3-4 carb servings per meal for females, 4-5 carb servings per meal for males  Spread carb intake throughout the day  Increase lean protein and vegetable intake  Avoid sugary drinks and processed carbs including crackers, cookies, cakes  2.  Exercise: Recommend at least 30 minutes of exercise daily, at least 5 days per week. Increase exercise gradually.   3.  Blood Glucose Goal: Blood glucose goal <150 fasting, <180 2 hr postprandial  4.  Microalbumin due 5/2020  5.  Education performed during this visit: long term diabetic complications discussed. , annual eye examinations at Ophthalmology discussed, dental hygiene discussed  and foot care reviewed., home glucose monitoring emphasized, all medications, side effects and compliance discussed carefully and Hypoglycemia management and prevention reviewed. Reviewed ‘ABCs’ of diabetes management (respective goals in parentheses):  A1C (<7), blood pressure (<130/80), and cholesterol (LDL <100, if CVD <70).    HTN  -refill losartan  -bp acceptable today    There are no Patient Instructions on file for this visit.    Follow up: Return in about 4 months (around 6/26/2020).    Discussed the nature of the disease including, risks, complications, implications, management, safe and proper use of medications. Encouraged therapeutic lifestyle changes including low calorie diet with plenty of fruits and vegetables, daily exercise, medication compliance, and keeping scheduled follow up appointments with me and any other providers. Encouraged patient to have appointment for complete physical, fasting labs, appropriate screenings, and immunizations on an annual basis.      Javier Jewell PA-C

## 2020-03-11 ENCOUNTER — TELEPHONE (OUTPATIENT)
Dept: INTERNAL MEDICINE | Facility: CLINIC | Age: 60
End: 2020-03-11

## 2020-03-11 DIAGNOSIS — I10 ESSENTIAL HYPERTENSION: ICD-10-CM

## 2020-03-11 DIAGNOSIS — E11.649 TYPE 2 DIABETES MELLITUS WITH HYPOGLYCEMIA WITHOUT COMA, WITH LONG-TERM CURRENT USE OF INSULIN (HCC): ICD-10-CM

## 2020-03-11 DIAGNOSIS — Z79.4 TYPE 2 DIABETES MELLITUS WITH HYPOGLYCEMIA WITHOUT COMA, WITH LONG-TERM CURRENT USE OF INSULIN (HCC): ICD-10-CM

## 2020-03-11 RX ORDER — LOSARTAN POTASSIUM 100 MG/1
100 TABLET ORAL DAILY
Qty: 90 TABLET | Refills: 1 | Status: SHIPPED | OUTPATIENT
Start: 2020-03-11 | End: 2020-06-06 | Stop reason: SDUPTHER

## 2020-03-11 RX ORDER — ATORVASTATIN CALCIUM 40 MG/1
40 TABLET, FILM COATED ORAL DAILY
Qty: 90 TABLET | Refills: 1 | Status: SHIPPED | OUTPATIENT
Start: 2020-03-11 | End: 2020-11-04

## 2020-03-13 DIAGNOSIS — I10 ESSENTIAL HYPERTENSION: ICD-10-CM

## 2020-03-14 DIAGNOSIS — I10 ESSENTIAL HYPERTENSION: ICD-10-CM

## 2020-03-16 RX ORDER — LOSARTAN POTASSIUM 100 MG/1
100 TABLET ORAL DAILY
Qty: 90 TABLET | Refills: 1 | OUTPATIENT
Start: 2020-03-16

## 2020-03-23 RX ORDER — CEFDINIR 300 MG/1
300 CAPSULE ORAL 2 TIMES DAILY
Qty: 20 CAPSULE | Refills: 0 | Status: SHIPPED | OUTPATIENT
Start: 2020-03-23 | End: 2020-11-04

## 2020-06-06 DIAGNOSIS — Z79.4 TYPE 2 DIABETES MELLITUS WITH HYPOGLYCEMIA WITHOUT COMA, WITH LONG-TERM CURRENT USE OF INSULIN (HCC): ICD-10-CM

## 2020-06-06 DIAGNOSIS — E11.649 TYPE 2 DIABETES MELLITUS WITH HYPOGLYCEMIA WITHOUT COMA, WITH LONG-TERM CURRENT USE OF INSULIN (HCC): ICD-10-CM

## 2020-06-06 DIAGNOSIS — I10 ESSENTIAL HYPERTENSION: ICD-10-CM

## 2020-06-08 RX ORDER — LOSARTAN POTASSIUM 100 MG/1
100 TABLET ORAL DAILY
Qty: 90 TABLET | Refills: 0 | Status: SHIPPED | OUTPATIENT
Start: 2020-06-08 | End: 2020-09-01

## 2020-06-22 ENCOUNTER — TELEPHONE (OUTPATIENT)
Dept: INTERNAL MEDICINE | Facility: CLINIC | Age: 60
End: 2020-06-22

## 2020-06-22 NOTE — TELEPHONE ENCOUNTER
----- Message from Leila Conroy CMA sent at 6/22/2020 10:32 AM EDT -----  Regarding: FW: Non-Urgent Medical Question  Contact: [phone]      ----- Message -----  From: Sancho Salazar  Sent: 6/22/2020  10:30 AM EDT  To: [facility]  Subject: Non-Urgent Medical Question                      Hi i am experiencing sinus issues and have no fever for the past two days. The drainage is very runny yellow in color and typical symptoms for sinus issues I have had in the past. Is it possible to get a prescription written for me regarding this.     Thanks it would be greatly appreciated   7

## 2020-06-24 ENCOUNTER — TELEMEDICINE (OUTPATIENT)
Dept: INTERNAL MEDICINE | Facility: CLINIC | Age: 60
End: 2020-06-24

## 2020-06-24 DIAGNOSIS — J06.9 VIRAL UPPER RESPIRATORY TRACT INFECTION: Primary | ICD-10-CM

## 2020-06-24 PROCEDURE — U0003 INFECTIOUS AGENT DETECTION BY NUCLEIC ACID (DNA OR RNA); SEVERE ACUTE RESPIRATORY SYNDROME CORONAVIRUS 2 (SARS-COV-2) (CORONAVIRUS DISEASE [COVID-19]), AMPLIFIED PROBE TECHNIQUE, MAKING USE OF HIGH THROUGHPUT TECHNOLOGIES AS DESCRIBED BY CMS-2020-01-R: HCPCS | Performed by: NURSE PRACTITIONER

## 2020-06-24 PROCEDURE — 99213 OFFICE O/P EST LOW 20 MIN: CPT | Performed by: NURSE PRACTITIONER

## 2020-06-29 ENCOUNTER — TELEMEDICINE (OUTPATIENT)
Dept: ENDOCRINOLOGY | Facility: CLINIC | Age: 60
End: 2020-06-29

## 2020-06-29 DIAGNOSIS — E78.2 HYPERLIPIDEMIA, MIXED: ICD-10-CM

## 2020-06-29 DIAGNOSIS — Z79.4 CONTROLLED TYPE 2 DIABETES MELLITUS WITH HYPOGLYCEMIA, WITH LONG-TERM CURRENT USE OF INSULIN (HCC): Primary | ICD-10-CM

## 2020-06-29 DIAGNOSIS — E11.649 CONTROLLED TYPE 2 DIABETES MELLITUS WITH HYPOGLYCEMIA, WITH LONG-TERM CURRENT USE OF INSULIN (HCC): Primary | ICD-10-CM

## 2020-06-29 PROCEDURE — 99442 PR PHYS/QHP TELEPHONE EVALUATION 11-20 MIN: CPT | Performed by: PHYSICIAN ASSISTANT

## 2020-06-29 NOTE — PROGRESS NOTES
"Video visit conducted to comply with patient safety concerns in accordance with CDC recommendations for the COVID-19 pandemic.       Chief Complaint  F/u for Diabetes Mellitus.    HPI   Sancho Salazar is a 60 y.o. male who is here today for f/u of Diabetes Mellitus type 2. The initial diagnosis of diabetes was made 2016.    Pt is doing well.   C/o frequent urination with Jardiance, somewhat bothersome.  Sometimes feels sx of hypoglycemia during the day.     A1C- 6.8 (2/26/2020), 6.5 (8/16/19), 10.9 (5/17/19)    Diabetic complications: none  Eye exam current (within one year): due  Foot care and dental care: discussed    Current diabetic medications include:  Jardiance 25mg daily  Toujeo 22u qhs. Had hypoglycemia with higher dose    Statin: lipitor 40mg    Past medications: metformin (severe abdominal pain)    Diabetic Monitoring  -   Meter downloaded and reviewed- he checks bs 1-2x/day with most fbs readings 100-120, during the day <160. He reports occasional hypoglycemia x during the day    Nutrition:     Current diet: in general, an \"unhealthy\" diet, on average, 2-3 meals per day  Current exercise: none, sedentary job  Seen RD in past: no    The following portions of the patient's history were reviewed and updated by me as appropriate: allergies, current medications, past family history, past social history, past surgical history and problem list.      Past Medical History:   Diagnosis Date   • Diabetes mellitus (CMS/[facility])    • Hypertension        Medications    Current Outpatient Medications:   •  atorvastatin (LIPITOR) 40 MG tablet, Take 1 tablet by mouth Daily., Disp: 90 tablet, Rfl: 1  •  cefdinir (OMNICEF) 300 MG capsule, Take 1 capsule by mouth 2 (Two) Times a Day., Disp: 20 capsule, Rfl: 0  •  Empagliflozin (Jardiance) 10 MG tablet, Take 10 mg by mouth Daily., Disp: 90 tablet, Rfl: 1  •  Insulin Glargine, 1 Unit Dial, (Toujeo SoloStar) 300 UNIT/ML solution pen-injector injection, Inject 22 Units under the skin " into the appropriate area as directed every night at bedtime., Disp: 6 pen, Rfl: 1  •  Insulin Pen Needle 31G X 5 MM misc, Use daily with insulin injections, Disp: 150 each, Rfl: 12  •  losartan (COZAAR) 100 MG tablet, Take 1 tablet by mouth Daily., Disp: 90 tablet, Rfl: 0    Review of Systems  Review of Systems   Constitutional: Negative for chills, fatigue, fever and unexpected weight change.   HENT: Negative for ear discharge, ear pain, hearing loss, nosebleeds, rhinorrhea and sore throat.    Eyes: Negative for pain, redness and visual disturbance.   Respiratory: Negative for cough, shortness of breath and wheezing.    Cardiovascular: Negative for chest pain, palpitations and leg swelling.   Gastrointestinal: Negative for abdominal pain, blood in stool, constipation, diarrhea, nausea and vomiting.   Endocrine: Negative for cold intolerance, heat intolerance and polydipsia.   Genitourinary: Negative for difficulty urinating, discharge, dysuria, hematuria, penile pain, penile swelling, scrotal swelling, testicular pain and urgency.   Musculoskeletal: Positive for arthralgias. Negative for gait problem, joint swelling and myalgias.   Skin: Negative for rash.   Allergic/Immunologic: Negative.    Neurological: Negative for dizziness, syncope, weakness, numbness and headaches.   Hematological: Negative for adenopathy. Does not bruise/bleed easily.   Psychiatric/Behavioral: Negative for sleep disturbance and suicidal ideas. The patient is not nervous/anxious.         Physical Exam    There were no vitals taken for this visit.There is no height or weight on file to calculate BMI.  Physical Exam   Constitutional: He appears well-developed and well-nourished. No distress.   AAOx3   HENT:   Head: Normocephalic and atraumatic.   Right Ear: External ear normal.   Left Ear: External ear normal.   Skin: He is not diaphoretic. No erythema. No pallor.   Psychiatric: He has a normal mood and affect. His behavior is normal.  Judgment and thought content normal.   Limited exam due video/phone visit      Labs and Imaging   Lab Results   Component Value Date    HGBA1C 6.8 02/26/2020    HGBA1C 6.5 08/16/2019    HGBA1C 10.9 05/17/2019     Admission on 06/24/2020, Discharged on 06/24/2020   Component Date Value Ref Range Status   • SARS-CoV-2, RAFAEL 06/24/2020 Not Detected  Not Detected Final    This test was developed and its performance characteristics determined  by [facility]. This test has not been FDA cleared or  approved. This test has been authorized by FDA under an Emergency Use  Authorization (EUA). This test is only authorized for the duration of  time the declaration that circumstances exist justifying the  authorization of the emergency use of in vitro diagnostic tests for  detection of SARS-CoV-2 virus and/or diagnosis of COVID-19 infection  under section 564(b)(1) of the Act, 21 U.S.C. 360bbb-3(b)(1), unless  the authorization is terminated or revoked sooner.  When diagnostic testing is negative, the possibility of a false  negative result should be considered in the context of a patient's  recent exposures and the presence of clinical signs and symptoms  consistent with COVID-19. An individual without symptoms of COVID-19  and who is not shedding SARS-CoV-2 virus would expect to have a  negative (not detected) result in this assay.     No images are attached to the encounter or orders placed in the encounter.  No Images in the past 120 days found..    Assessment / Plan   Sancho was seen today for follow-up.    Diagnoses and all orders for this visit:    Controlled type 2 diabetes mellitus with hypoglycemia, with long-term current use of insulin (CMS/[facility])  -     Microalbumin / Creatinine Urine Ratio - Urine, Clean Catch  -     Comprehensive Metabolic Panel  -     Lipid Panel  -     TSH  -     Hemoglobin A1c  -     Empagliflozin (Jardiance) 10 MG tablet; Take 10 mg by mouth Daily.  -     Insulin Glargine, 1 Unit Dial,  (Toujeo SoloStar) 300 UNIT/ML solution pen-injector injection; Inject 22 Units under the skin into the appropriate area as directed every night at bedtime.    Hyperlipidemia, mixed      Diabetes Mellitus 2 is under fair control.  -A1c 6.8 last visit  -with occasional hypoglycemia  -continue toujeo to 22u qhs. May need to decrease to 20 units if he continues to have hypoglycemic sx during the day  -decrease jardiance to 10 mg daily due to urinary frequency. .     1.  Diet: 3-4 carb servings per meal for females, 4-5 carb servings per meal for males  Spread carb intake throughout the day  Increase lean protein and vegetable intake  Avoid sugary drinks and processed carbs including crackers, cookies, cakes  2.  Exercise: Recommend at least 30 minutes of exercise daily, at least 5 days per week. Increase exercise gradually.   3.  Blood Glucose Goal: Blood glucose goal <150 fasting, <180 2 hr postprandial  4.  Microalbumin due 5/2020  5.  Education performed during this visit: long term diabetic complications discussed. , annual eye examinations at Ophthalmology discussed, dental hygiene discussed  and foot care reviewed., home glucose monitoring emphasized, all medications, side effects and compliance discussed carefully and Hypoglycemia management and prevention reviewed. Reviewed ‘ABCs’ of diabetes management (respective goals in parentheses):  A1C (<7), blood pressure (<130/80), and cholesterol (LDL <100, if CVD <70).    Hyperlipidemia  -on lipitor 40mg  -added order for fasting labs    There are no Patient Instructions on file for this visit.    Follow up: Return in about 4 months (around 10/29/2020).    Discussed the nature of the disease including, risks, complications, implications, management, safe and proper use of medications. Encouraged therapeutic lifestyle changes including low calorie diet with plenty of fruits and vegetables, daily exercise, medication compliance, and keeping scheduled follow up  appointments with me and any other providers. Encouraged patient to have appointment for complete physical, fasting labs, appropriate screenings, and immunizations on an annual basis.    Total time of discussion was 13 minutes.      Javier Jewell PA-C

## 2020-07-06 NOTE — PROGRESS NOTES
Chief Complaint   Patient presents with   • Sinus Problem        Subjective     History of Present Illness   This was an audio and video enabled telemedicine encounter.    This was an audio and video enabled telemedicine encounter.     You have chosen to receive care through a telehealth visit.  Do you consent to use a video/audio connection for your medical care today? Yes     Sinus dc yellow and was in memo in 1/20 and had sinusitis then treated with antibiotic and improved.     He thought he currently had sinus infection but getting better now with limited nasal yellow thick discharge.     The following portions of the patient's history were reviewed and updated as appropriate: allergies, current medications, past family history, past medical history, past social history, past surgical history and problem list.  Unable to obtain vital signs    Current Outpatient Medications:   •  atorvastatin (LIPITOR) 40 MG tablet, Take 1 tablet by mouth Daily., Disp: 90 tablet, Rfl: 1  •  cefdinir (OMNICEF) 300 MG capsule, Take 1 capsule by mouth 2 (Two) Times a Day., Disp: 20 capsule, Rfl: 0  •  Empagliflozin (Jardiance) 10 MG tablet, Take 10 mg by mouth Daily., Disp: 90 tablet, Rfl: 1  •  Insulin Glargine, 1 Unit Dial, (Toujeo SoloStar) 300 UNIT/ML solution pen-injector injection, Inject 22 Units under the skin into the appropriate area as directed every night at bedtime., Disp: 6 pen, Rfl: 1  •  Insulin Pen Needle 31G X 5 MM misc, Use daily with insulin injections, Disp: 150 each, Rfl: 12  •  losartan (COZAAR) 100 MG tablet, Take 1 tablet by mouth Daily., Disp: 90 tablet, Rfl: 0    There were no vitals filed for this visit.    There is no height or weight on file to calculate BMI.        Review of Systems   Constitutional: Negative.    HENT: Positive for congestion, postnasal drip, rhinorrhea and sinus pressure.    Respiratory: Negative.    Cardiovascular: Negative.    Gastrointestinal: Negative.    Psychiatric/Behavioral:  Negative.        Objective   Physical Exam   Constitutional: He is oriented to person, place, and time. He appears well-developed and well-nourished.   Eyes: Conjunctivae are normal. Right eye exhibits no discharge. Left eye exhibits no discharge.   Cardiovascular: Normal rate and regular rhythm.   Pulmonary/Chest: Breath sounds normal.   Neurological: He is oriented to person, place, and time.          Assessment/Plan   Sancho was seen today for sinus problem.    Diagnoses and all orders for this visit:    Viral upper respiratory tract infection  -     COVID-19,[facility] ROUTINE, NP/OP SWAB IN TRANSPORT MEDIA OR ESWAB 72 HR TAT - Swab, Oropharynx; Future  -     QUESTIONNAIRE SERIES            Return if symptoms worsen or fail to improve.  RTC/call  If symptoms worsen  Meds MOA and SE's reviewed and pt v/u

## 2020-08-18 ENCOUNTER — TELEPHONE (OUTPATIENT)
Dept: INTERNAL MEDICINE | Facility: CLINIC | Age: 60
End: 2020-08-18

## 2020-08-18 NOTE — TELEPHONE ENCOUNTER
Mailbox full see message below        ----- Message from Leanne Hoover sent at 8/18/2020  2:40 PM EDT -----  Regarding: FW: Non-Urgent Medical Question  Contact: [phone]     Please reach out to the patient to reschedule his appointment      ----- Message -----  From: Sancho Salazar  Sent: 8/17/2020   5:35 PM EDT  To: [facility]  Subject: Non-Urgent Medical Question                      [place] Gal[place]    Well unfortunatelly I need to reschedule my appointment again my mother has just passed away this week and I cannot make the appointment. She succumbed  to a long bueno with Dementia this past week. A very trying time for our family at the moment.     If we can move it out at least a week that would be very helpful.    Kelly

## 2020-08-20 PROBLEM — Z79.4 TYPE 2 DIABETES MELLITUS WITHOUT COMPLICATION, WITH LONG-TERM CURRENT USE OF INSULIN: Status: ACTIVE | Noted: 2019-05-17

## 2020-09-01 DIAGNOSIS — I10 ESSENTIAL HYPERTENSION: ICD-10-CM

## 2020-09-01 RX ORDER — LOSARTAN POTASSIUM 50 MG/1
TABLET ORAL
Qty: 180 TABLET | Refills: 0 | Status: SHIPPED | OUTPATIENT
Start: 2020-09-01 | End: 2020-12-21

## 2020-10-01 ENCOUNTER — TELEMEDICINE (OUTPATIENT)
Dept: FAMILY MEDICINE CLINIC | Facility: TELEHEALTH | Age: 60
End: 2020-10-01

## 2020-10-01 DIAGNOSIS — J01.10 ACUTE FRONTAL SINUSITIS, RECURRENCE NOT SPECIFIED: Primary | ICD-10-CM

## 2020-10-01 PROCEDURE — 99213 OFFICE O/P EST LOW 20 MIN: CPT | Performed by: NURSE PRACTITIONER

## 2020-10-01 RX ORDER — DOXYCYCLINE 100 MG/1
100 CAPSULE ORAL 2 TIMES DAILY
Qty: 20 CAPSULE | Refills: 0 | Status: SHIPPED | OUTPATIENT
Start: 2020-10-01 | End: 2020-10-11

## 2020-10-01 NOTE — PATIENT INSTRUCTIONS
-Discontinue netipot for now while you are having nosebleeds, can do occasional saline nasal spray and vaseline in the nose.  -May use tylenol, ibuprofen, warm moist rag to the face for pain relief.  -If symptoms worsen or do not improve by 48 hours on the antibiotic, go to urgent care or your primary care.  -Go to [facility] for Covid test within 24 hours. [facility] will call you with results in 1-5 days from a blocked number.    Sinusitis, Adult  Sinusitis is soreness and swelling (inflammation) of your sinuses. Sinuses are hollow spaces in the bones around your face. They are located:  · Around your eyes.  · In the middle of your forehead.  · Behind your nose.  · In your cheekbones.  Your sinuses and nasal passages are lined with a fluid called mucus. Mucus drains out of your sinuses. Swelling can trap mucus in your sinuses. This lets germs (bacteria, virus, or fungus) grow, which leads to infection. Most of the time, this condition is caused by a virus.  What are the causes?  This condition is caused by:  · Allergies.  · Asthma.  · Germs.  · Things that block your nose or sinuses.  · Growths in the nose (nasal polyps).  · Chemicals or irritants in the air.  · Fungus (rare).  What increases the risk?  You are more likely to develop this condition if:  · You have a weak body defense system (immune system).  · You do a lot of swimming or diving.  · You use nasal sprays too much.  · You smoke.  What are the signs or symptoms?  The main symptoms of this condition are pain and a feeling of pressure around the sinuses. Other symptoms include:  · Stuffy nose (congestion).  · Runny nose (drainage).  · Swelling and warmth in the sinuses.  · Headache.  · Toothache.  · A cough that may get worse at night.  · Mucus that collects in the throat or the back of the nose (postnasal drip).  · Being unable to smell and taste.  · Being very tired (fatigue).  · A fever.  · Sore throat.  · Bad breath.  How is this  diagnosed?  This condition is diagnosed based on:  · Your symptoms.  · Your medical history.  · A physical exam.  · Tests to find out if your condition is short-term (acute) or long-term (chronic). Your doctor may:  ? Check your nose for growths (polyps).  ? Check your sinuses using a tool that has a light (endoscope).  ? Check for allergies or germs.  ? Do imaging tests, such as an MRI or CT scan.  How is this treated?  Treatment for this condition depends on the cause and whether it is short-term or long-term.  · If caused by a virus, your symptoms should go away on their own within 10 days. You may be given medicines to relieve symptoms. They include:  ? Medicines that shrink swollen tissue in the nose.  ? Medicines that treat allergies (antihistamines).  ? A spray that treats swelling of the nostrils.   ? Rinses that help get rid of thick mucus in your nose (nasal saline washes).  · If caused by bacteria, your doctor may wait to see if you will get better without treatment. You may be given antibiotic medicine if you have:  ? A very bad infection.  ? A weak body defense system.  · If caused by growths in the nose, you may need to have surgery.  Follow these instructions at home:  Medicines  · Take, use, or apply over-the-counter and prescription medicines only as told by your doctor. These may include nasal sprays.  · If you were prescribed an antibiotic medicine, take it as told by your doctor. Do not stop taking the antibiotic even if you start to feel better.  Hydrate and humidify    · Drink enough water to keep your pee (urine) pale yellow.  · Use a cool mist humidifier to keep the humidity level in your home above 50%.  · Breathe in steam for 10-15 minutes, 3-4 times a day, or as told by your doctor. You can do this in the bathroom while a hot shower is running.  · Try not to spend time in cool or dry air.  Rest  · Rest as much as you can.  · Sleep with your head raised (elevated).  · Make sure you get  enough sleep each night.  General instructions    · Put a warm, moist washcloth on your face 3-4 times a day, or as often as told by your doctor. This will help with discomfort.  · Wash your hands often with soap and water. If there is no soap and water, use hand .  · Do not smoke. Avoid being around people who are smoking (secondhand smoke).  · Keep all follow-up visits as told by your doctor. This is important.  Contact a doctor if:  · You have a fever.  · Your symptoms get worse.  · Your symptoms do not get better within 10 days.  Get help right away if:  · You have a very bad headache.  · You cannot stop throwing up (vomiting).  · You have very bad pain or swelling around your face or eyes.  · You have trouble seeing.  · You feel confused.  · Your neck is stiff.  · You have trouble breathing.  Summary  · Sinusitis is swelling of your sinuses. Sinuses are hollow spaces in the bones around your face.  · This condition is caused by tissues in your nose that become inflamed or swollen. This traps germs. These can lead to infection.  · If you were prescribed an antibiotic medicine, take it as told by your doctor. Do not stop taking it even if you start to feel better.  · Keep all follow-up visits as told by your doctor. This is important.  This information is not intended to replace advice given to you by your health care provider. Make sure you discuss any questions you have with your health care provider.  Document Released: 06/05/2009 Document Revised: 05/20/2019 Document Reviewed: 05/20/2019  [employer] Patient Education © 2020 [employer]    Doxycycline tablets or capsules  What is this medicine?  DOXYCYCLINE (dox brittani chase) is a tetracycline antibiotic. It kills certain bacteria or stops their growth. It is used to treat many kinds of infections, like dental, skin, respiratory, and urinary tract infections. It also treats acne, Lyme disease, malaria, and certain sexually transmitted infections.  This  medicine may be used for other purposes; ask your health care provider or pharmacist if you have questions.  COMMON BRAND NAME(S): Acticlate, Adoxa, Adoxa CK, Adoxa Beau, Adoxa TT, Alodox, Avidoxy, Doxal, LYMEPAK, Mondoxyne NL, Monodox, Morgidox 1x, Morgidox 1x Kit, Morgidox 2x, Morgidox 2x Kit, NutriDox, Ocudox, Okebo, TARGADOX, Vibra-Tabs, Vibramycin  What should I tell my health care provider before I take this medicine?  They need to know if you have any of these conditions:  · liver disease  · long exposure to sunlight like working outdoors  · stomach problems like colitis  · an unusual or allergic reaction to doxycycline, tetracycline antibiotics, other medicines, foods, dyes, or preservatives  · pregnant or trying to get pregnant  · breast-feeding  How should I use this medicine?  Take this medicine by mouth with a full glass of water. Follow the directions on the prescription label. It is best to take this medicine without food, but if it upsets your stomach take it with food. Take your medicine at regular intervals. Do not take your medicine more often than directed. Take all of your medicine as directed even if you think you are better. Do not skip doses or stop your medicine early.  Talk to your pediatrician regarding the use of this medicine in children. While this drug may be prescribed for selected conditions, precautions do apply.  Overdosage: If you think you have taken too much of this medicine contact a poison control center or emergency room at once.  NOTE: This medicine is only for you. Do not share this medicine with others.  What if I miss a dose?  If you miss a dose, take it as soon as you can. If it is almost time for your next dose, take only that dose. Do not take double or extra doses.  What may interact with this medicine?  · antacids  · barbiturates  · birth control pills  · bismuth subsalicylate  · carbamazepine  · methoxyflurane  · other antibiotics  · phenytoin  · vitamins that contain  iron  · warfarin  This list may not describe all possible interactions. Give your health care provider a list of all the medicines, herbs, non-prescription drugs, or dietary supplements you use. Also tell them if you smoke, drink alcohol, or use illegal drugs. Some items may interact with your medicine.  What should I watch for while using this medicine?  Tell your doctor or health care professional if your symptoms do not improve.  Do not treat diarrhea with over the counter products. Contact your doctor if you have diarrhea that lasts more than 2 days or if it is severe and watery.  Do not take this medicine just before going to bed. It may not dissolve properly when you lay down and can cause pain in your throat. Drink plenty of fluids while taking this medicine to also help reduce irritation in your throat.  This medicine can make you more sensitive to the sun. Keep out of the sun. If you cannot avoid being in the sun, wear protective clothing and use sunscreen. Do not use sun lamps or tanning beds/booths.  Birth control pills may not work properly while you are taking this medicine. Talk to your doctor about using an extra method of birth control.  If you are being treated for a sexually transmitted infection, avoid sexual contact until you have finished your treatment. Your sexual partner may also need treatment.  Avoid antacids, aluminum, calcium, magnesium, and iron products for 4 hours before and 2 hours after taking a dose of this medicine.  If you are using this medicine to prevent malaria, you should still protect yourself from contact with mosquitos. Stay in screened-in areas, use mosquito nets, keep your body covered, and use an insect repellent.  What side effects may I notice from receiving this medicine?  Side effects that you should report to your doctor or health care professional as soon as possible:  · allergic reactions like skin rash, itching or hives, swelling of the face, lips, or  tongue  · difficulty breathing  · fever  · itching in the rectal or genital area  · pain on swallowing  · rash, fever, and swollen lymph nodes  · redness, blistering, peeling or loosening of the skin, including inside the mouth  · severe stomach pain or cramps  · unusual bleeding or bruising  · unusually weak or tired  · yellowing of the eyes or skin  Side effects that usually do not require medical attention (report to your doctor or health care professional if they continue or are bothersome):  · diarrhea  · loss of appetite  · nausea, vomiting  This list may not describe all possible side effects. Call your doctor for medical advice about side effects. You may report side effects to FDA at 6-758-STI-4902.  Where should I keep my medicine?  Keep out of the reach of children.  Store at room temperature, below 30 degrees C (86 degrees F). Protect from light. Keep container tightly closed. Throw away any unused medicine after the expiration date. Taking this medicine after the expiration date can make you seriously ill.  NOTE: This sheet is a summary. It may not cover all possible information. If you have questions about this medicine, talk to your doctor, pharmacist, or health care provider.  © 2020 [employer] (2020-03-19 13:44:53)

## 2020-10-01 NOTE — PROGRESS NOTES
Subjective   Sancho Salazar is a 60 y.o. male.     The symptoms started a week ago, prior to that he had allergy symptoms. He started having pressure in his face. He has had a runny nose with yellow mucus coming from the left nostril. He had a nosebleed this morning. He has been using his netipot and he has a burning sensation when using. Denies fever. He moved here from [place] and since then has had multiple sinus infections and allergies. He is tired, not sleeping well. His vision is blurry, his eyes feel irritated.    Sinusitis  This is a new problem. The current episode started in the past 7 days. The problem has been gradually worsening since onset. There has been no fever. The pain is mild. Associated symptoms include congestion, headaches, a hoarse voice, sinus pressure, sneezing and a sore throat. Pertinent negatives include no chills, coughing, ear pain, shortness of breath or swollen glands. Diaphoresis: plugged, popping. Treatments tried: mucinex, sudafed, netipot. The treatment provided mild relief.        The following portions of the patient's history were reviewed and updated as appropriate: allergies, current medications, past family history, past medical history, past social history, past surgical history and problem list.    Review of Systems   Constitutional: Positive for fatigue. Negative for chills and fever. Diaphoresis: plugged, popping.   HENT: Positive for congestion, hoarse voice, sinus pressure, sneezing and sore throat. Negative for ear pain and swollen glands.    Respiratory: Negative for cough and shortness of breath.        Objective   Physical Exam  Constitutional:       General: He is not in acute distress.     Appearance: He is well-developed. He is not diaphoretic.   HENT:      Nose:      Right Sinus: Frontal sinus tenderness present. No maxillary sinus tenderness.      Left Sinus: Frontal sinus tenderness present. No maxillary sinus tenderness.   Pulmonary:      Effort: Pulmonary  effort is normal.   Lymphadenopathy:      Head:      Right side of head: No preauricular, posterior auricular or occipital adenopathy.      Left side of head: No preauricular, posterior auricular or occipital adenopathy.      Cervical: No cervical adenopathy.      Comments: Patient guided exam   Neurological:      Mental Status: He is alert and oriented to person, place, and time.   Psychiatric:         Behavior: Behavior normal.           Assessment/Plan   Diagnoses and all orders for this visit:    Acute frontal sinusitis, recurrence not specified  -     COVID-19,[facility] ROUTINE, NP/OP SWAB IN TRANSPORT MEDIA OR ESWAB 72 HR TAT - Swab, Nasopharynx; Future  -     QUESTIONNAIRE SERIES    Other orders  -     doxycycline (MONODOX) 100 MG capsule; Take 1 capsule by mouth 2 (Two) Times a Day for 10 days.             I spent 15 minutes in the patient's chart for this video visit.

## 2020-11-04 ENCOUNTER — LAB (OUTPATIENT)
Dept: LAB | Facility: HOSPITAL | Age: 60
End: 2020-11-04

## 2020-11-04 ENCOUNTER — OFFICE VISIT (OUTPATIENT)
Dept: ENDOCRINOLOGY | Facility: CLINIC | Age: 60
End: 2020-11-04

## 2020-11-04 VITALS
OXYGEN SATURATION: 98 % | BODY MASS INDEX: 29.9 KG/M2 | WEIGHT: 208.4 LBS | HEART RATE: 78 BPM | DIASTOLIC BLOOD PRESSURE: 90 MMHG | SYSTOLIC BLOOD PRESSURE: 142 MMHG

## 2020-11-04 DIAGNOSIS — E78.2 MIXED HYPERLIPIDEMIA: ICD-10-CM

## 2020-11-04 DIAGNOSIS — E11.649 TYPE 2 DIABETES MELLITUS WITH HYPOGLYCEMIA WITHOUT COMA, WITH LONG-TERM CURRENT USE OF INSULIN (HCC): Primary | ICD-10-CM

## 2020-11-04 DIAGNOSIS — E11.649 DIABETIC HYPOGLYCEMIA (HCC): ICD-10-CM

## 2020-11-04 DIAGNOSIS — Z79.4 TYPE 2 DIABETES MELLITUS WITH HYPOGLYCEMIA WITHOUT COMA, WITH LONG-TERM CURRENT USE OF INSULIN (HCC): Primary | ICD-10-CM

## 2020-11-04 LAB
GLUCOSE BLDC GLUCOMTR-MCNC: 174 MG/DL (ref 70–130)
HBA1C MFR BLD: 6.6 %

## 2020-11-04 PROCEDURE — 80053 COMPREHEN METABOLIC PANEL: CPT | Performed by: PHYSICIAN ASSISTANT

## 2020-11-04 PROCEDURE — 83036 HEMOGLOBIN GLYCOSYLATED A1C: CPT | Performed by: PHYSICIAN ASSISTANT

## 2020-11-04 PROCEDURE — 82570 ASSAY OF URINE CREATININE: CPT | Performed by: PHYSICIAN ASSISTANT

## 2020-11-04 PROCEDURE — 80061 LIPID PANEL: CPT | Performed by: PHYSICIAN ASSISTANT

## 2020-11-04 PROCEDURE — 82043 UR ALBUMIN QUANTITATIVE: CPT | Performed by: PHYSICIAN ASSISTANT

## 2020-11-04 PROCEDURE — 84443 ASSAY THYROID STIM HORMONE: CPT | Performed by: PHYSICIAN ASSISTANT

## 2020-11-04 PROCEDURE — 82947 ASSAY GLUCOSE BLOOD QUANT: CPT | Performed by: PHYSICIAN ASSISTANT

## 2020-11-04 PROCEDURE — 99214 OFFICE O/P EST MOD 30 MIN: CPT | Performed by: PHYSICIAN ASSISTANT

## 2020-11-04 RX ORDER — INSULIN GLARGINE 300 U/ML
22 INJECTION, SOLUTION SUBCUTANEOUS
Qty: 6 PEN | Refills: 1 | Status: SHIPPED | OUTPATIENT
Start: 2020-11-04 | End: 2021-03-10 | Stop reason: SDUPTHER

## 2020-11-04 NOTE — PROGRESS NOTES
"Chief Complaint  F/u for Diabetes Mellitus.    HPI   Sancho Salazar is a 60 y.o. male who is here today for f/u of Diabetes Mellitus type 2. The initial diagnosis of diabetes was made 2016.    Pt is doing well.   C/o urinary hesitancy with jardiance. Seems to resolve if he doesn't take the medication. No other urinary symptoms.     A1C- (11/4/2020), 6.8 (2/26/2020), 6.5 (8/16/19), 10.9 (5/17/19)    Diabetic complications: none  Eye exam current (within one year): due  Foot care and dental care: discussed    Current diabetic medications include:  Jardiance 10mg daily - had frequent urination with higher dose  Toujeo 22u qhs.     Statin: stopped taking lipitor 40mg    Past medications: metformin (severe abdominal pain)    Diabetic Monitoring  -   Meter downloaded and reviewed- he checks bs 1-2x/day with most fbs readings 100-120, during the day <180. He reports occasional hypoglycemia x during the day     Nutrition:     Current diet: in general, an \"unhealthy\" diet, on average, 2-3 meals per day  Current exercise: none, sedentary job  Seen RD in past: no    The following portions of the patient's history were reviewed and updated by me as appropriate: allergies, current medications, past family history, past social history, past surgical history and problem list.      Past Medical History:   Diagnosis Date   • Diabetes mellitus (CMS/[facility])    • Hypertension        Medications    Current Outpatient Medications:   •  Empagliflozin (Jardiance) 10 MG tablet, Take 10 mg by mouth Daily., Disp: 90 tablet, Rfl: 1  •  Insulin Glargine, 1 Unit Dial, (Toujeo SoloStar) 300 UNIT/ML solution pen-injector injection, Inject 22 Units under the skin into the appropriate area as directed every night at bedtime., Disp: 6 pen, Rfl: 1  •  Insulin Pen Needle 31G X 5 MM misc, Use daily with insulin injections, Disp: 150 each, Rfl: 12  •  losartan (COZAAR) 50 MG tablet, TAKE 2 TABLETS BY MOUTH EVERY DAY, Disp: 180 tablet, Rfl: 0    Review of " Systems  Review of Systems   Constitutional: Negative for chills, fatigue, fever and unexpected weight change.   HENT: Negative for ear discharge, ear pain, hearing loss, nosebleeds, rhinorrhea and sore throat.    Eyes: Negative for pain, redness and visual disturbance.   Respiratory: Negative for cough, shortness of breath and wheezing.    Cardiovascular: Negative for chest pain, palpitations and leg swelling.   Gastrointestinal: Negative for abdominal pain, blood in stool, constipation, diarrhea, nausea and vomiting.   Endocrine: Negative for cold intolerance, heat intolerance and polydipsia.   Genitourinary: Negative for difficulty urinating, discharge, dysuria, hematuria, penile pain, penile swelling, scrotal swelling, testicular pain and urgency.   Musculoskeletal: Positive for arthralgias. Negative for gait problem, joint swelling and myalgias.   Skin: Negative for rash.   Allergic/Immunologic: Negative.    Neurological: Negative for dizziness, syncope, weakness, numbness and headaches.   Hematological: Negative for adenopathy. Does not bruise/bleed easily.   Psychiatric/Behavioral: Negative for sleep disturbance and suicidal ideas. The patient is not nervous/anxious.         Physical Exam    /90   Pulse 78   Wt 94.5 kg (208 lb 6.4 oz)   SpO2 98%   BMI 29.90 kg/m² Body mass index is 29.9 kg/m².  Physical Exam   Constitutional: He appears well-developed. No distress.   AAOx3   HENT:   Head: Normocephalic and atraumatic.   Right Ear: External ear normal.   Left Ear: External ear normal.    Sancho had a diabetic foot exam performed today.   During the foot exam he had a monofilament test performed.    Neurological Sensory Findings - Unaltered hot/cold right ankle/foot discrimination and unaltered hot/cold left ankle/foot discrimination. Unaltered sharp/dull right ankle/foot discrimination and unaltered sharp/dull left ankle/foot discrimination. No right ankle/foot altered proprioception and no left  ankle/foot altered proprioception  Vascular Status -  His right foot exhibits normal foot vasculature  and no edema. His left foot exhibits normal foot vasculature  and no edema.  Skin Integrity  -  His right foot skin is intact.  He has no right foot onychomycosis, no right foot ulcer and non-callous right foot.His left foot skin is intact. He has no left foot onychomycosis, no left foot ulcer and non-callous left foot..  Skin: He is not diaphoretic. No erythema. No pallor.   Psychiatric: His behavior is normal. Judgment and thought content normal.         Labs and Imaging   Lab Results   Component Value Date    HGBA1C 6.6 11/04/2020    HGBA1C 6.8 02/26/2020    HGBA1C 6.5 08/16/2019     Office Visit on 11/04/2020   Component Date Value Ref Range Status   • Glucose 11/04/2020 174* 70 - 130 mg/dL Final   • Hemoglobin A1C 11/04/2020 6.6  % Corrected     No images are attached to the encounter or orders placed in the encounter.  No Images in the past 120 days found..    Assessment / Plan   Diagnoses and all orders for this visit:    1. Type 2 diabetes mellitus with hypoglycemia without coma, with long-term current use of insulin (CMS/[facility]) (Primary)  -     POC Glucose, Blood  -     POC Glycosylated Hemoglobin (Hb A1C)  -     Insulin Glargine, 1 Unit Dial, (Toujeo SoloStar) 300 UNIT/ML solution pen-injector injection; Inject 22 Units under the skin into the appropriate area as directed every night at bedtime.  Dispense: 6 pen; Refill: 1    2. Mixed hyperlipidemia    3. Diabetic hypoglycemia (CMS/[facility])      Diabetes Mellitus 2 is under good control.  -A1c 6.6 with occasional day time hypoglycemia  -decrease toujeo to 20u qhs to avoid hypoglycemia.   -change jardiance to 10 mg to farxiga 5 mg daily. Samples provided. Pt to let me know if he needs a new rx    1.  Diet: 3-4 carb servings per meal for females, 4-5 carb servings per meal for males  Spread carb intake throughout the day  Increase lean protein and vegetable  intake  Avoid sugary drinks and processed carbs including crackers, cookies, cakes  2.  Exercise: Recommend at least 30 minutes of exercise daily, at least 5 days per week. Increase exercise gradually.   3.  Blood Glucose Goal: Blood glucose goal <150 fasting, <180 2 hr postprandial  4.  Microalbumin due 5/2020  5.  Education performed during this visit: long term diabetic complications discussed. , annual eye examinations at Ophthalmology discussed, dental hygiene discussed  and foot care reviewed., home glucose monitoring emphasized, all medications, side effects and compliance discussed carefully and Hypoglycemia management and prevention reviewed. Reviewed ‘ABCs’ of diabetes management (respective goals in parentheses):  A1C (<7), blood pressure (<130/80), and cholesterol (LDL <100, if CVD <70).    Hyperlipidemia  -stopped taking lipitor 40mg  -repeat lipids    There are no Patient Instructions on file for this visit.    Follow up: Return in about 4 months (around 3/4/2021).    Discussed the nature of the disease including, risks, complications, implications, management, safe and proper use of medications. Encouraged therapeutic lifestyle changes including low calorie diet with plenty of fruits and vegetables, daily exercise, medication compliance, and keeping scheduled follow up appointments with me and any other providers. Encouraged patient to have appointment for complete physical, fasting labs, appropriate screenings, and immunizations on an annual basis.        Javier Jewell PA-C

## 2020-11-05 ENCOUNTER — TELEPHONE (OUTPATIENT)
Dept: ENDOCRINOLOGY | Facility: CLINIC | Age: 60
End: 2020-11-05

## 2020-11-05 LAB
ALBUMIN SERPL-MCNC: 4.5 G/DL (ref 3.5–5.2)
ALBUMIN UR-MCNC: <1.2 MG/DL
ALBUMIN/GLOB SERPL: 2.1 G/DL
ALP SERPL-CCNC: 59 U/L (ref 39–117)
ALT SERPL W P-5'-P-CCNC: 28 U/L (ref 1–41)
ANION GAP SERPL CALCULATED.3IONS-SCNC: 10.2 MMOL/L (ref 5–15)
AST SERPL-CCNC: 23 U/L (ref 1–40)
BILIRUB SERPL-MCNC: 1.2 MG/DL (ref 0–1.2)
BUN SERPL-MCNC: 15 MG/DL (ref 8–23)
BUN/CREAT SERPL: 17.2 (ref 7–25)
CALCIUM SPEC-SCNC: 9.4 MG/DL (ref 8.6–10.5)
CHLORIDE SERPL-SCNC: 104 MMOL/L (ref 98–107)
CHOLEST SERPL-MCNC: 206 MG/DL (ref 0–200)
CO2 SERPL-SCNC: 23.8 MMOL/L (ref 22–29)
CREAT SERPL-MCNC: 0.87 MG/DL (ref 0.76–1.27)
CREAT UR-MCNC: 74.3 MG/DL
GFR SERPL CREATININE-BSD FRML MDRD: 89 ML/MIN/1.73
GLOBULIN UR ELPH-MCNC: 2.1 GM/DL
GLUCOSE SERPL-MCNC: 174 MG/DL (ref 65–99)
HBA1C MFR BLD: 6.7 % (ref 4.8–5.6)
HDLC SERPL-MCNC: 64 MG/DL (ref 40–60)
LDLC SERPL CALC-MCNC: 118 MG/DL (ref 0–100)
LDLC/HDLC SERPL: 1.79 {RATIO}
MICROALBUMIN/CREAT UR: NORMAL MG/G{CREAT}
POTASSIUM SERPL-SCNC: 4.1 MMOL/L (ref 3.5–5.2)
PROT SERPL-MCNC: 6.6 G/DL (ref 6–8.5)
SODIUM SERPL-SCNC: 138 MMOL/L (ref 136–145)
TRIGL SERPL-MCNC: 137 MG/DL (ref 0–150)
TSH SERPL DL<=0.05 MIU/L-ACNC: 0.8 UIU/ML (ref 0.27–4.2)
VLDLC SERPL-MCNC: 24 MG/DL (ref 5–40)

## 2020-11-05 NOTE — TELEPHONE ENCOUNTER
Informed patient of lab results, pt voiced understanding.  Patient does not want to try any Cholesterol medication.     Patient states he never start any cholesterol medication.    Patient states he does not want to add another medication.  Patient states he saw his lab results via [facility] already.

## 2020-11-05 NOTE — TELEPHONE ENCOUNTER
----- Message from Javier Jewell PA-C sent at 11/5/2020  2:06 PM EST -----  Please call pt.  This cholesterol is back and I noticed he is no longer on his cholesterol medication. Please inquire about this. If he did not tolerate I would strongly suggest trying a different one, crestor 40mg, for example. If pt agrees please pend. Thank you.   Labs are otherwise good.

## 2020-12-17 DIAGNOSIS — E11.649 CONTROLLED TYPE 2 DIABETES MELLITUS WITH HYPOGLYCEMIA, WITH LONG-TERM CURRENT USE OF INSULIN (HCC): ICD-10-CM

## 2020-12-17 DIAGNOSIS — Z79.4 CONTROLLED TYPE 2 DIABETES MELLITUS WITH HYPOGLYCEMIA, WITH LONG-TERM CURRENT USE OF INSULIN (HCC): ICD-10-CM

## 2020-12-17 RX ORDER — EMPAGLIFLOZIN 10 MG/1
TABLET, FILM COATED ORAL
Qty: 90 TABLET | Refills: 1 | Status: SHIPPED | OUTPATIENT
Start: 2020-12-17 | End: 2021-05-13 | Stop reason: SDUPTHER

## 2020-12-19 DIAGNOSIS — I10 ESSENTIAL HYPERTENSION: ICD-10-CM

## 2020-12-21 RX ORDER — LOSARTAN POTASSIUM 50 MG/1
TABLET ORAL
Qty: 180 TABLET | Refills: 0 | Status: SHIPPED | OUTPATIENT
Start: 2020-12-21 | End: 2021-03-03 | Stop reason: SDUPTHER

## 2021-03-03 DIAGNOSIS — I10 ESSENTIAL HYPERTENSION: ICD-10-CM

## 2021-03-04 RX ORDER — LOSARTAN POTASSIUM 50 MG/1
100 TABLET ORAL DAILY
Qty: 180 TABLET | Refills: 1 | Status: SHIPPED | OUTPATIENT
Start: 2021-03-04 | End: 2021-06-16 | Stop reason: SDUPTHER

## 2021-03-10 ENCOUNTER — OFFICE VISIT (OUTPATIENT)
Dept: ENDOCRINOLOGY | Facility: CLINIC | Age: 61
End: 2021-03-10

## 2021-03-10 VITALS
SYSTOLIC BLOOD PRESSURE: 124 MMHG | HEIGHT: 70 IN | TEMPERATURE: 97.3 F | HEART RATE: 78 BPM | BODY MASS INDEX: 30.09 KG/M2 | OXYGEN SATURATION: 99 % | WEIGHT: 210.2 LBS | DIASTOLIC BLOOD PRESSURE: 80 MMHG

## 2021-03-10 DIAGNOSIS — Z79.4 TYPE 2 DIABETES MELLITUS WITHOUT COMPLICATION, WITH LONG-TERM CURRENT USE OF INSULIN (HCC): Primary | Chronic | ICD-10-CM

## 2021-03-10 DIAGNOSIS — E11.9 TYPE 2 DIABETES MELLITUS WITHOUT COMPLICATION, WITH LONG-TERM CURRENT USE OF INSULIN (HCC): Primary | Chronic | ICD-10-CM

## 2021-03-10 DIAGNOSIS — E78.2 MIXED HYPERLIPIDEMIA: Chronic | ICD-10-CM

## 2021-03-10 LAB
GLUCOSE BLDC GLUCOMTR-MCNC: 131 MG/DL (ref 70–130)
HBA1C MFR BLD: 7.1 %

## 2021-03-10 PROCEDURE — 83036 HEMOGLOBIN GLYCOSYLATED A1C: CPT | Performed by: PHYSICIAN ASSISTANT

## 2021-03-10 PROCEDURE — 99214 OFFICE O/P EST MOD 30 MIN: CPT | Performed by: PHYSICIAN ASSISTANT

## 2021-03-10 PROCEDURE — 82947 ASSAY GLUCOSE BLOOD QUANT: CPT | Performed by: PHYSICIAN ASSISTANT

## 2021-03-10 RX ORDER — INSULIN GLARGINE 300 U/ML
20 INJECTION, SOLUTION SUBCUTANEOUS
Qty: 6 PEN | Refills: 1
Start: 2021-03-10 | End: 2021-03-24

## 2021-03-10 NOTE — PROGRESS NOTES
"Chief Complaint  F/u for Diabetes Mellitus.    HPI   Sancho Salazar is a 61 y.o. male who is here today for f/u of Diabetes Mellitus type 2. The initial diagnosis of diabetes was made 2016.    Pt is doing well.   Admits to more food excursions and less exercise over the last few months.     A1C- 7.1 (11/4/2020), 6.8 (2/26/2020), 6.5 (8/16/19), 10.9 (5/17/19)    Diabetic complications: none  Eye exam current (within one year): due  Foot care and dental care: discussed    Current diabetic medications include:  Jardiance 10mg daily - had frequent urination with higher dose  Toujeo 20u qhs.     Statin: stopped taking lipitor 40mg    Past medications: metformin (severe abdominal pain)    Diabetic Monitoring  -   Meter downloaded and reviewed- he checks bs 1-2x/day with most fasting blood sugar readings 100-130, there is only one reading in the evening available for review-160, no hypoglycemia per meter, per denies further hypoglycemia after decrease in toujeo last visit    Nutrition:     Current diet: in general, an \"unhealthy\" diet, on average, 2-3 meals per day  Current exercise: none, sedentary job  Seen RD in past: no    The following portions of the patient's history were reviewed and updated by me as appropriate: allergies, current medications, past family history, past social history, past surgical history and problem list.        Past Medical History:   Diagnosis Date   • Diabetes mellitus (CMS/[facility])    • Hypertension        Medications    Current Outpatient Medications:   •  Insulin Glargine, 1 Unit Dial, (Toujeo SoloStar) 300 UNIT/ML solution pen-injector injection, Inject 20 Units under the skin into the appropriate area as directed every night at bedtime., Disp: 6 pen, Rfl: 1  •  Insulin Pen Needle 31G X 5 MM misc, Use daily with insulin injections, Disp: 150 each, Rfl: 12  •  Jardiance 10 MG tablet, TAKE 1 TABLET BY MOUTH EVERY DAY, Disp: 90 tablet, Rfl: 1  •  losartan (COZAAR) 50 MG tablet, Take 2 tablets by " "mouth Daily., Disp: 180 tablet, Rfl: 1    Review of Systems  Review of Systems   Constitutional: Negative for chills, fatigue, fever and unexpected weight change.   HENT: Negative for ear discharge, ear pain, hearing loss, nosebleeds, rhinorrhea and sore throat.    Eyes: Negative for pain, redness and visual disturbance.   Respiratory: Negative for cough, shortness of breath and wheezing.    Cardiovascular: Negative for chest pain, palpitations and leg swelling.   Gastrointestinal: Negative for abdominal pain, blood in stool, constipation, diarrhea, nausea and vomiting.   Endocrine: Negative for cold intolerance, heat intolerance and polydipsia.   Genitourinary: Negative for difficulty urinating, discharge, dysuria, hematuria, penile pain, penile swelling, scrotal swelling, testicular pain and urgency.   Musculoskeletal: Positive for arthralgias. Negative for gait problem, joint swelling and myalgias.   Skin: Negative for rash.   Allergic/Immunologic: Negative.    Neurological: Positive for tremors (chronic). Negative for dizziness, syncope, weakness, numbness and headaches.   Hematological: Negative for adenopathy. Does not bruise/bleed easily.   Psychiatric/Behavioral: Negative for sleep disturbance and suicidal ideas. The patient is not nervous/anxious.         Physical Exam    /80   Pulse 78   Temp 97.3 °F (36.3 °C)   Ht 177.8 cm (70\")   Wt 95.3 kg (210 lb 3.2 oz)   SpO2 99%   BMI 30.16 kg/m² Body mass index is 30.16 kg/m².  Physical Exam   Constitutional: He is oriented to person, place, and time. He appears well-developed. No distress.   HENT:   Head: Normocephalic.   Right Ear: External ear normal.   Left Ear: External ear normal.   Nose: Nose normal.   Eyes: Conjunctivae are normal. Right eye exhibits no discharge. Left eye exhibits no discharge. No scleral icterus.   Neck: No JVD present. No tracheal deviation present. No thyromegaly present.   Cardiovascular: Normal rate, regular rhythm and " normal heart sounds.   No murmur heard.  Pulmonary/Chest: Effort normal and breath sounds normal. No respiratory distress. He has no wheezes.   Abdominal: Soft. Bowel sounds are normal. There is no abdominal tenderness.   Musculoskeletal: No tenderness.   Neurological: He is alert and oriented to person, place, and time.   Bilateral hand tremor   Skin: Skin is warm and dry. No rash noted. He is not diaphoretic. No erythema.   Psychiatric: His behavior is normal. Judgment and thought content normal.         Labs and Imaging   Lab Results   Component Value Date    HGBA1C 7.1 03/10/2021    HGBA1C 6.70 (H) 11/04/2020    HGBA1C 6.6 11/04/2020     Office Visit on 03/10/2021   Component Date Value Ref Range Status   • Glucose 03/10/2021 131* 70 - 130 mg/dL Final   • Hemoglobin A1C 03/10/2021 7.1  % Final     No images are attached to the encounter or orders placed in the encounter.  No Images in the past 120 days found..    Assessment / Plan   Diagnoses and all orders for this visit:    1. Type 2 diabetes mellitus without complication, with long-term current use of insulin (CMS/[facility]) (Primary)  -     POC Glucose, Blood  -     POC Glycosylated Hemoglobin (Hb A1C)  -     Insulin Glargine, 1 Unit Dial, (Toujeo SoloStar) 300 UNIT/ML solution pen-injector injection; Inject 20 Units under the skin into the appropriate area as directed every night at bedtime.  Dispense: 6 pen; Refill: 1    2. Mixed hyperlipidemia      Diabetes Mellitus 2 is under fair control.  -A1c 7.1 without hypoglycemia  -Meter downloaded and reviewed- he checks bs 1-2x/day with most fasting blood sugar readings 100-130, there is only one reading in the evening available for review-160, no hypoglycemia per meter  -continue toujeo 20u qhs  -continue jardiance 10 mg daily. Samples provided.  -eye exam due - encouraged to schedule, labs utd 11/2020, foot exam updated 11/2020    1.  Diet: 3-4 carb servings per meal for females, 4-5 carb servings per meal for  males  Spread carb intake throughout the day  Increase lean protein and vegetable intake  Avoid sugary drinks and processed carbs including crackers, cookies, cakes  2.  Exercise: Recommend at least 30 minutes of exercise daily, at least 5 days per week. Increase exercise gradually.   3.  Blood Glucose Goal: Blood glucose goal <150 fasting, <180 2 hr postprandial  4.  Microalbumin due 11/2021  5.  Education performed during this visit: long term diabetic complications discussed. , annual eye examinations at Ophthalmology discussed, dental hygiene discussed  and foot care reviewed., home glucose monitoring emphasized, all medications, side effects and compliance discussed carefully and Hypoglycemia management and prevention reviewed. Reviewed ‘ABCs’ of diabetes management (respective goals in parentheses):  A1C (<7), blood pressure (<130/80), and cholesterol (LDL <100, if CVD <70).    Hyperlipidemia  -pt stopped taking lipitor 40mg on his own, I sent crestor 40 in the past but he did not try taking it  -Primary cardiovascular prevention benefits of statins reviewed, however patient continues to refuse trying another statin medication    There are no Patient Instructions on file for this visit.    Follow up: Return in about 3 months (around 6/10/2021).    Discussed the nature of the disease including, risks, complications, implications, management, safe and proper use of medications. Encouraged therapeutic lifestyle changes including low calorie diet with plenty of fruits and vegetables, daily exercise, medication compliance, and keeping scheduled follow up appointments with me and any other providers. Encouraged patient to have appointment for complete physical, fasting labs, appropriate screenings, and immunizations on an annual basis.        Javier Jewell PA-C

## 2021-03-23 ENCOUNTER — OFFICE VISIT (OUTPATIENT)
Dept: INTERNAL MEDICINE | Facility: CLINIC | Age: 61
End: 2021-03-23

## 2021-03-23 VITALS
OXYGEN SATURATION: 95 % | WEIGHT: 208.6 LBS | TEMPERATURE: 97.1 F | SYSTOLIC BLOOD PRESSURE: 132 MMHG | HEART RATE: 93 BPM | BODY MASS INDEX: 29.93 KG/M2 | DIASTOLIC BLOOD PRESSURE: 92 MMHG | RESPIRATION RATE: 18 BRPM

## 2021-03-23 DIAGNOSIS — Z12.11 COLON CANCER SCREENING: ICD-10-CM

## 2021-03-23 DIAGNOSIS — E78.2 MIXED HYPERLIPIDEMIA: Chronic | ICD-10-CM

## 2021-03-23 DIAGNOSIS — Z00.00 ANNUAL PHYSICAL EXAM: Primary | ICD-10-CM

## 2021-03-23 DIAGNOSIS — Z79.4 TYPE 2 DIABETES MELLITUS WITHOUT COMPLICATION, WITH LONG-TERM CURRENT USE OF INSULIN (HCC): ICD-10-CM

## 2021-03-23 DIAGNOSIS — R06.83 SNORING: ICD-10-CM

## 2021-03-23 DIAGNOSIS — I10 ESSENTIAL HYPERTENSION: ICD-10-CM

## 2021-03-23 DIAGNOSIS — E11.9 TYPE 2 DIABETES MELLITUS WITHOUT COMPLICATION, WITH LONG-TERM CURRENT USE OF INSULIN (HCC): ICD-10-CM

## 2021-03-23 PROCEDURE — 99396 PREV VISIT EST AGE 40-64: CPT | Performed by: NURSE PRACTITIONER

## 2021-03-24 DIAGNOSIS — Z79.4 TYPE 2 DIABETES MELLITUS WITHOUT COMPLICATION, WITH LONG-TERM CURRENT USE OF INSULIN (HCC): Chronic | ICD-10-CM

## 2021-03-24 DIAGNOSIS — E11.9 TYPE 2 DIABETES MELLITUS WITHOUT COMPLICATION, WITH LONG-TERM CURRENT USE OF INSULIN (HCC): Chronic | ICD-10-CM

## 2021-03-24 RX ORDER — INSULIN GLARGINE 300 U/ML
20 INJECTION, SOLUTION SUBCUTANEOUS
Qty: 9 ML | Refills: 1 | Status: SHIPPED | OUTPATIENT
Start: 2021-03-24 | End: 2021-09-13

## 2021-03-24 NOTE — TELEPHONE ENCOUNTER
Last oV 3/10/21 Future appt 6/21/21   Does not appear it was confirmed by pharmacy    E-Prescribing Status    Outpatient Medication Detail    Insulin Glargine, 1 Unit Dial, (Toujeo SoloStar) 300 UNIT/ML solution pen-injector injection        Sig: Inject 20 Units under the skin into the appropriate area as directed every night at bedtime.        Class: No Print        Route: Subcutaneous

## 2021-04-12 PROBLEM — Z79.4 TYPE 2 DIABETES MELLITUS WITHOUT COMPLICATION, WITH LONG-TERM CURRENT USE OF INSULIN: Chronic | Status: RESOLVED | Noted: 2019-05-17 | Resolved: 2021-04-12

## 2021-04-12 PROBLEM — E11.9 TYPE 2 DIABETES MELLITUS WITHOUT COMPLICATION, WITH LONG-TERM CURRENT USE OF INSULIN (HCC): Chronic | Status: RESOLVED | Noted: 2019-05-17 | Resolved: 2021-04-12

## 2021-05-04 ENCOUNTER — LAB (OUTPATIENT)
Dept: INTERNAL MEDICINE | Facility: CLINIC | Age: 61
End: 2021-05-04

## 2021-05-04 DIAGNOSIS — I10 ESSENTIAL HYPERTENSION: ICD-10-CM

## 2021-05-04 DIAGNOSIS — Z79.4 TYPE 2 DIABETES MELLITUS WITHOUT COMPLICATION, WITH LONG-TERM CURRENT USE OF INSULIN (HCC): ICD-10-CM

## 2021-05-04 DIAGNOSIS — R06.83 SNORING: ICD-10-CM

## 2021-05-04 DIAGNOSIS — E78.2 MIXED HYPERLIPIDEMIA: Chronic | ICD-10-CM

## 2021-05-04 DIAGNOSIS — E11.9 TYPE 2 DIABETES MELLITUS WITHOUT COMPLICATION, WITH LONG-TERM CURRENT USE OF INSULIN (HCC): ICD-10-CM

## 2021-05-04 LAB
ALBUMIN SERPL-MCNC: 4.2 G/DL (ref 3.5–5.2)
ALBUMIN/GLOB SERPL: 2 G/DL
ALP SERPL-CCNC: 57 U/L (ref 39–117)
ALT SERPL W P-5'-P-CCNC: 20 U/L (ref 1–41)
ANION GAP SERPL CALCULATED.3IONS-SCNC: 7.3 MMOL/L (ref 5–15)
AST SERPL-CCNC: 14 U/L (ref 1–40)
BASOPHILS # BLD AUTO: 0.04 10*3/MM3 (ref 0–0.2)
BASOPHILS NFR BLD AUTO: 0.8 % (ref 0–1.5)
BILIRUB BLD-MCNC: NEGATIVE MG/DL
BILIRUB SERPL-MCNC: 1.3 MG/DL (ref 0–1.2)
BUN SERPL-MCNC: 13 MG/DL (ref 8–23)
BUN/CREAT SERPL: 13.7 (ref 7–25)
CALCIUM SPEC-SCNC: 9 MG/DL (ref 8.6–10.5)
CHLORIDE SERPL-SCNC: 101 MMOL/L (ref 98–107)
CHOLEST SERPL-MCNC: 197 MG/DL (ref 0–200)
CLARITY, POC: CLEAR
CO2 SERPL-SCNC: 28.7 MMOL/L (ref 22–29)
COLOR UR: YELLOW
CREAT SERPL-MCNC: 0.95 MG/DL (ref 0.76–1.27)
DEPRECATED RDW RBC AUTO: 40.4 FL (ref 37–54)
EOSINOPHIL # BLD AUTO: 0.13 10*3/MM3 (ref 0–0.4)
EOSINOPHIL NFR BLD AUTO: 2.6 % (ref 0.3–6.2)
ERYTHROCYTE [DISTWIDTH] IN BLOOD BY AUTOMATED COUNT: 12 % (ref 12.3–15.4)
EXPIRATION DATE: NORMAL
GFR SERPL CREATININE-BSD FRML MDRD: 80 ML/MIN/1.73
GLOBULIN UR ELPH-MCNC: 2.1 GM/DL
GLUCOSE SERPL-MCNC: 110 MG/DL (ref 65–99)
GLUCOSE UR STRIP-MCNC: NEGATIVE MG/DL
HCT VFR BLD AUTO: 46.6 % (ref 37.5–51)
HDLC SERPL-MCNC: 60 MG/DL (ref 40–60)
HGB BLD-MCNC: 16.1 G/DL (ref 13–17.7)
IMM GRANULOCYTES # BLD AUTO: 0.01 10*3/MM3 (ref 0–0.05)
IMM GRANULOCYTES NFR BLD AUTO: 0.2 % (ref 0–0.5)
KETONES UR QL: NEGATIVE
LDLC SERPL CALC-MCNC: 119 MG/DL (ref 0–100)
LDLC/HDLC SERPL: 1.95 {RATIO}
LEUKOCYTE EST, POC: NEGATIVE
LYMPHOCYTES # BLD AUTO: 1.36 10*3/MM3 (ref 0.7–3.1)
LYMPHOCYTES NFR BLD AUTO: 26.8 % (ref 19.6–45.3)
Lab: NORMAL
MCH RBC QN AUTO: 31.7 PG (ref 26.6–33)
MCHC RBC AUTO-ENTMCNC: 34.5 G/DL (ref 31.5–35.7)
MCV RBC AUTO: 91.7 FL (ref 79–97)
MONOCYTES # BLD AUTO: 0.47 10*3/MM3 (ref 0.1–0.9)
MONOCYTES NFR BLD AUTO: 9.3 % (ref 5–12)
NEUTROPHILS NFR BLD AUTO: 3.07 10*3/MM3 (ref 1.7–7)
NEUTROPHILS NFR BLD AUTO: 60.3 % (ref 42.7–76)
NITRITE UR-MCNC: NEGATIVE MG/ML
NRBC BLD AUTO-RTO: 0 /100 WBC (ref 0–0.2)
PH UR: 6.5 [PH] (ref 5–8)
PLATELET # BLD AUTO: 215 10*3/MM3 (ref 140–450)
PMV BLD AUTO: 9.8 FL (ref 6–12)
POTASSIUM SERPL-SCNC: 4.2 MMOL/L (ref 3.5–5.2)
PROT SERPL-MCNC: 6.3 G/DL (ref 6–8.5)
PROT UR STRIP-MCNC: NEGATIVE MG/DL
RBC # BLD AUTO: 5.08 10*6/MM3 (ref 4.14–5.8)
RBC # UR STRIP: NEGATIVE /UL
SODIUM SERPL-SCNC: 137 MMOL/L (ref 136–145)
SP GR UR: 1.01 (ref 1–1.03)
TRIGL SERPL-MCNC: 100 MG/DL (ref 0–150)
TSH SERPL DL<=0.05 MIU/L-ACNC: 2.07 UIU/ML (ref 0.27–4.2)
UROBILINOGEN UR QL: NORMAL
VLDLC SERPL-MCNC: 18 MG/DL (ref 5–40)
WBC # BLD AUTO: 5.08 10*3/MM3 (ref 3.4–10.8)

## 2021-05-04 PROCEDURE — 36415 COLL VENOUS BLD VENIPUNCTURE: CPT | Performed by: NURSE PRACTITIONER

## 2021-05-04 PROCEDURE — 81003 URINALYSIS AUTO W/O SCOPE: CPT | Performed by: NURSE PRACTITIONER

## 2021-05-04 PROCEDURE — 85025 COMPLETE CBC W/AUTO DIFF WBC: CPT | Performed by: NURSE PRACTITIONER

## 2021-05-04 PROCEDURE — 80053 COMPREHEN METABOLIC PANEL: CPT | Performed by: NURSE PRACTITIONER

## 2021-05-04 PROCEDURE — 84443 ASSAY THYROID STIM HORMONE: CPT | Performed by: NURSE PRACTITIONER

## 2021-05-04 PROCEDURE — 80061 LIPID PANEL: CPT | Performed by: NURSE PRACTITIONER

## 2021-05-13 DIAGNOSIS — E11.649 CONTROLLED TYPE 2 DIABETES MELLITUS WITH HYPOGLYCEMIA, WITH LONG-TERM CURRENT USE OF INSULIN (HCC): ICD-10-CM

## 2021-05-13 DIAGNOSIS — Z79.4 CONTROLLED TYPE 2 DIABETES MELLITUS WITH HYPOGLYCEMIA, WITH LONG-TERM CURRENT USE OF INSULIN (HCC): ICD-10-CM

## 2021-05-14 RX ORDER — EMPAGLIFLOZIN 10 MG/1
1 TABLET, FILM COATED ORAL DAILY
Qty: 90 TABLET | Refills: 1 | Status: SHIPPED | OUTPATIENT
Start: 2021-05-14 | End: 2021-12-10

## 2021-05-25 RX ORDER — FLURBIPROFEN SODIUM 0.3 MG/ML
SOLUTION/ DROPS OPHTHALMIC
Qty: 100 EACH | Refills: 3 | Status: SHIPPED | OUTPATIENT
Start: 2021-05-25 | End: 2022-07-24 | Stop reason: SDUPTHER

## 2021-06-16 DIAGNOSIS — I10 ESSENTIAL HYPERTENSION: ICD-10-CM

## 2021-06-16 RX ORDER — LOSARTAN POTASSIUM 50 MG/1
100 TABLET ORAL DAILY
Qty: 180 TABLET | Refills: 0 | Status: SHIPPED | OUTPATIENT
Start: 2021-06-16 | End: 2021-09-08

## 2021-06-21 ENCOUNTER — OFFICE VISIT (OUTPATIENT)
Dept: ENDOCRINOLOGY | Facility: CLINIC | Age: 61
End: 2021-06-21

## 2021-06-21 VITALS
HEART RATE: 86 BPM | DIASTOLIC BLOOD PRESSURE: 80 MMHG | HEIGHT: 70 IN | OXYGEN SATURATION: 98 % | WEIGHT: 210 LBS | SYSTOLIC BLOOD PRESSURE: 140 MMHG | BODY MASS INDEX: 30.06 KG/M2

## 2021-06-21 DIAGNOSIS — E78.2 MIXED HYPERLIPIDEMIA: Chronic | ICD-10-CM

## 2021-06-21 DIAGNOSIS — E11.9 CONTROLLED TYPE 2 DIABETES MELLITUS WITHOUT COMPLICATION, WITH LONG-TERM CURRENT USE OF INSULIN (HCC): Primary | Chronic | ICD-10-CM

## 2021-06-21 DIAGNOSIS — Z79.4 CONTROLLED TYPE 2 DIABETES MELLITUS WITHOUT COMPLICATION, WITH LONG-TERM CURRENT USE OF INSULIN (HCC): Primary | Chronic | ICD-10-CM

## 2021-06-21 PROBLEM — E11.65 UNCONTROLLED TYPE 2 DIABETES MELLITUS WITH HYPERGLYCEMIA: Chronic | Status: ACTIVE | Noted: 2021-06-21

## 2021-06-21 PROBLEM — E11.65 UNCONTROLLED TYPE 2 DIABETES MELLITUS WITH HYPERGLYCEMIA (HCC): Status: ACTIVE | Noted: 2021-06-21

## 2021-06-21 LAB
GLUCOSE BLDC GLUCOMTR-MCNC: 143 MG/DL (ref 70–130)
HBA1C MFR BLD: 6.4 %

## 2021-06-21 PROCEDURE — 83036 HEMOGLOBIN GLYCOSYLATED A1C: CPT | Performed by: PHYSICIAN ASSISTANT

## 2021-06-21 PROCEDURE — 82947 ASSAY GLUCOSE BLOOD QUANT: CPT | Performed by: PHYSICIAN ASSISTANT

## 2021-06-21 PROCEDURE — 99214 OFFICE O/P EST MOD 30 MIN: CPT | Performed by: PHYSICIAN ASSISTANT

## 2021-06-21 NOTE — PROGRESS NOTES
"Chief Complaint  F/u for Diabetes Mellitus.    HPI   Sancho Salazar is a 61 y.o. male who is here today for f/u of Diabetes Mellitus type 2. The initial diagnosis of diabetes was made 2016.    Pt reports to be doing well.  Has been exercising since last visit. Rides bike and sometimes jogs.     A1C- 6.4 (6/21/21), 7.1 (11/4/2020), 6.8 (2/26/2020), 6.5 (8/16/19), 10.9 (5/17/19)    Diabetic complications: none  Eye exam current (within one year): due  Foot care and dental care: discussed    Current diabetic medications include:  Jardiance 10mg daily - had frequent urination with higher dose  Toujeo 20u qhs.     Statin: stopped taking lipitor 40mg    Past medications: metformin (severe abdominal pain)    Diabetic Monitoring  -   He reports occasional hypoglycemia since starting exercise    Nutrition:     Current diet: in general, an \"unhealthy\" diet, on average, 2-3 meals per day  Current exercise: none, sedentary job  Seen RD in past: no    The following portions of the patient's history were reviewed and updated by me as appropriate: allergies, current medications, past family history, past social history, past surgical history and problem list.      Past Medical History:   Diagnosis Date   • Diabetes mellitus (CMS/[facility])    • Hypertension        Medications    Current Outpatient Medications:   •  Empagliflozin (Jardiance) 10 MG tablet, Take 10 mg by mouth Daily., Disp: 90 tablet, Rfl: 1  •  Insulin Glargine, 1 Unit Dial, (Toujeo SoloStar) 300 UNIT/ML solution pen-injector injection, Inject 20 Units under the skin into the appropriate area as directed every night at bedtime., Disp: 9 mL, Rfl: 1  •  Insulin Pen Needle (B-D UF III MINI PEN NEEDLES) 31G X 5 MM misc, USE DAILY WITH INSULIN INJECTIONS, Disp: 100 each, Rfl: 3  •  losartan (COZAAR) 50 MG tablet, Take 2 tablets by mouth Daily., Disp: 180 tablet, Rfl: 0    Review of Systems  Review of Systems   Constitutional: Negative for chills, fatigue, fever and unexpected " "weight change.   HENT: Negative for ear discharge, ear pain, hearing loss, nosebleeds, rhinorrhea and sore throat.    Eyes: Negative for pain, redness and visual disturbance.   Respiratory: Negative for cough, shortness of breath and wheezing.    Cardiovascular: Negative for chest pain, palpitations and leg swelling.   Gastrointestinal: Negative for abdominal pain, blood in stool, constipation, diarrhea, nausea and vomiting.   Endocrine: Negative for cold intolerance, heat intolerance and polydipsia.   Genitourinary: Negative for difficulty urinating, discharge, dysuria, hematuria, penile pain, penile swelling, scrotal swelling, testicular pain and urgency.   Musculoskeletal: Positive for arthralgias. Negative for gait problem, joint swelling and myalgias.   Skin: Negative for rash.   Allergic/Immunologic: Negative.    Neurological: Positive for tremors (chronic). Negative for dizziness, syncope, weakness, numbness and headaches.   Hematological: Negative for adenopathy. Does not bruise/bleed easily.   Psychiatric/Behavioral: Negative for sleep disturbance and suicidal ideas. The patient is not nervous/anxious.         Physical Exam    /80   Pulse 86   Ht 177.8 cm (70\")   Wt 95.3 kg (210 lb)   SpO2 98%   BMI 30.13 kg/m² Body mass index is 30.13 kg/m².  Physical Exam   Constitutional: He is oriented to person, place, and time. He appears well-developed. No distress.   HENT:   Head: Normocephalic.   Right Ear: External ear normal.   Left Ear: External ear normal.   Nose: Nose normal.   Eyes: Conjunctivae are normal. Right eye exhibits no discharge. Left eye exhibits no discharge. No scleral icterus.   Neck: No JVD present. No tracheal deviation present. No thyromegaly present.   Cardiovascular: Normal rate, regular rhythm and normal heart sounds.   No murmur heard.  Pulmonary/Chest: Effort normal and breath sounds normal. No respiratory distress. He has no wheezes.   Abdominal: Soft. Bowel sounds are " normal. There is no abdominal tenderness.   Musculoskeletal: No tenderness.   Neurological: He is alert and oriented to person, place, and time.   Bilateral hand tremor   Skin: Skin is warm and dry. No rash noted. He is not diaphoretic. No erythema.   Psychiatric: His behavior is normal. Judgment and thought content normal.         Labs and Imaging   Lab Results   Component Value Date    HGBA1C 6.4 06/21/2021    HGBA1C 7.1 03/10/2021    HGBA1C 6.70 (H) 11/04/2020     Office Visit on 06/21/2021   Component Date Value Ref Range Status   • Glucose 06/21/2021 143* 70 - 130 mg/dL Final   • Hemoglobin A1C 06/21/2021 6.4  % Final     No images are attached to the encounter or orders placed in the encounter.  No Images in the past 120 days found..    Assessment / Plan   Diagnoses and all orders for this visit:    1. Controlled type 2 diabetes mellitus without complication, with long-term current use of insulin (CMS/[facility]) (Primary)  -     POC Glucose, Blood  -     POC Glycosylated Hemoglobin (Hb A1C)    2. Mixed hyperlipidemia      Diabetes Mellitus 2 is under fair control.  -A1c 6.4, down from 7.1 last visit  -has some hypoglycemia since he started exercising  -decrease toujeo to 16 u qhs to avoid hypoglycemia  -continue jardiance 10 mg daily.   -eye exam due - encouraged to schedule, labs utd 11/2020, foot exam updated 11/2020    1.  Diet: 3-4 carb servings per meal for females, 4-5 carb servings per meal for males  Spread carb intake throughout the day  Increase lean protein and vegetable intake  Avoid sugary drinks and processed carbs including crackers, cookies, cakes  2.  Exercise: Recommend at least 30 minutes of exercise daily, at least 5 days per week. Increase exercise gradually.   3.  Blood Glucose Goal: Blood glucose goal <150 fasting, <180 2 hr postprandial  4.  Microalbumin due 11/2021  5.  Education performed during this visit: long term diabetic complications discussed. , annual eye examinations at  Ophthalmology discussed, dental hygiene discussed  and foot care reviewed., home glucose monitoring emphasized, all medications, side effects and compliance discussed carefully and Hypoglycemia management and prevention reviewed. Reviewed ‘ABCs’ of diabetes management (respective goals in parentheses):  A1C (<7), blood pressure (<130/80), and cholesterol (LDL <100, if CVD <70).    Hyperlipidemia  -pt stopped taking lipitor 40mg on his own, I sent crestor 40 in the past but he did not try taking it  -Primary cardiovascular prevention benefits of statins reviewed, however patient continues to refuse trying another statin medication    There are no Patient Instructions on file for this visit.    Follow up: Return in about 4 months (around 10/21/2021).    Discussed the nature of the disease including, risks, complications, implications, management, safe and proper use of medications. Encouraged therapeutic lifestyle changes including low calorie diet with plenty of fruits and vegetables, daily exercise, medication compliance, and keeping scheduled follow up appointments with me and any other providers. Encouraged patient to have appointment for complete physical, fasting labs, appropriate screenings, and immunizations on an annual basis.        Javier Jewell PA-C

## 2021-07-08 ENCOUNTER — TELEMEDICINE (OUTPATIENT)
Dept: FAMILY MEDICINE CLINIC | Facility: TELEHEALTH | Age: 61
End: 2021-07-08

## 2021-07-08 DIAGNOSIS — J01.00 ACUTE MAXILLARY SINUSITIS, RECURRENCE NOT SPECIFIED: Primary | ICD-10-CM

## 2021-07-08 PROCEDURE — 99213 OFFICE O/P EST LOW 20 MIN: CPT | Performed by: NURSE PRACTITIONER

## 2021-07-08 RX ORDER — DOXYCYCLINE 100 MG/1
100 CAPSULE ORAL 2 TIMES DAILY
Qty: 20 CAPSULE | Refills: 0 | Status: SHIPPED | OUTPATIENT
Start: 2021-07-08 | End: 2021-07-18

## 2021-07-08 NOTE — PROGRESS NOTES
CHIEF COMPLAINT  Chief Complaint   Patient presents with   • Sinusitis         HPI  Sancho Salazar is a 61 y.o. male  presents with complaint of 1 week history of sinus pain in left cheek area into upper left teeth, nasal congestion with yellow discharge, PND.  Denies fever, sore throat, ear pain, or cough.  Taking mucinex and using neti pot    Review of Systems   Constitutional: Negative for activity change, appetite change, fatigue and fever.   HENT: Positive for congestion, postnasal drip, sinus pressure and sinus pain. Negative for ear pain and sore throat.    Respiratory: Negative for cough, chest tightness, shortness of breath and wheezing.    Neurological: Negative for dizziness and headaches.   All other systems reviewed and are negative.      Past Medical History:   Diagnosis Date   • Diabetes mellitus (CMS/[facility])    • Hypertension        Family History   Problem Relation Age of Onset   • Hypertension Mother    • Hypertension Father        Social History     Socioeconomic History   • Marital status:      Spouse name: Not on file   • Number of children: Not on file   • Years of education: Not on file   • Highest education level: Not on file   Tobacco Use   • Smoking status: Never Smoker   • Smokeless tobacco: Never Used   Substance and Sexual Activity   • Alcohol use: No   • Drug use: No   • Sexual activity: Defer         There were no vitals taken for this visit.    PHYSICAL EXAM  Physical Exam   Constitutional: He is oriented to person, place, and time. He appears well-developed and well-nourished. He does not have a sickly appearance. He does not appear ill.   HENT:   Head: Normocephalic and atraumatic.   Left maxillary sinus tenderness   Neurological: He is alert and oriented to person, place, and time.       Results for orders placed or performed in visit on 06/21/21   POC Glucose, Blood    Specimen: Blood   Result Value Ref Range    Glucose 143 (A) 70 - 130 mg/dL   POC Glycosylated Hemoglobin (Hb  A1C)    Specimen: Blood   Result Value Ref Range    Hemoglobin A1C 6.4 %       Diagnoses and all orders for this visit:    1. Acute maxillary sinusitis, recurrence not specified (Primary)  -     doxycycline (MONODOX) 100 MG capsule; Take 1 capsule by mouth 2 (Two) Times a Day for 10 days.  Dispense: 20 capsule; Refill: 0    --discussed importance of completing entire course of Doxycycline as prescribed  --ADVISED--warm compresses to areas of sinus pain; tylenol or ibuprofen for pain; continue mucinex and neti pot for symptomatic relief; increased fluid intake  --if no improvement in 5-7 days, will need follow-up for further evaluation       FOLLOW-UP  As discussed during visit with PCP/[facility] if no improvement or Urgent Care/Emergency Department if worsening of symptoms    Patient verbalizes understanding of medication dosage, comfort measures, instructions for treatment and follow-up.    HECTOR Toro  07/08/2021  08:57 EDT    This visit was performed via Telehealth.  This patient has been instructed to follow-up with their primary care provider if their symptoms worsen or the treatment provided does not resolve their illness.

## 2021-07-08 NOTE — PATIENT INSTRUCTIONS
Sinusitis, Adult  Sinusitis is inflammation of your sinuses. Sinuses are hollow spaces in the bones around your face. Your sinuses are located:  · Around your eyes.  · In the middle of your forehead.  · Behind your nose.  · In your cheekbones.  Mucus normally drains out of your sinuses. When your nasal tissues become inflamed or swollen, mucus can become trapped or blocked. This allows bacteria, viruses, and fungi to grow, which leads to infection. Most infections of the sinuses are caused by a virus.  Sinusitis can develop quickly. It can last for up to 4 weeks (acute) or for more than 12 weeks (chronic). Sinusitis often develops after a cold.  What are the causes?  This condition is caused by anything that creates swelling in the sinuses or stops mucus from draining. This includes:  · Allergies.  · Asthma.  · Infection from bacteria or viruses.  · Deformities or blockages in your nose or sinuses.  · Abnormal growths in the nose (nasal polyps).  · Pollutants, such as chemicals or irritants in the air.  · Infection from fungi (rare).  What increases the risk?  You are more likely to develop this condition if you:  · Have a weak body defense system (immune system).  · Do a lot of swimming or diving.  · Overuse nasal sprays.  · Smoke.  What are the signs or symptoms?  The main symptoms of this condition are pain and a feeling of pressure around the affected sinuses. Other symptoms include:  · Stuffy nose or congestion.  · Thick drainage from your nose.  · Swelling and warmth over the affected sinuses.  · Headache.  · Upper toothache.  · A cough that may get worse at night.  · Extra mucus that collects in the throat or the back of the nose (postnasal drip).  · Decreased sense of smell and taste.  · Fatigue.  · A fever.  · Sore throat.  · Bad breath.  How is this diagnosed?  This condition is diagnosed based on:  · Your symptoms.  · Your medical history.  · A physical exam.  · Tests to find out if your condition is  acute or chronic. This may include:  ? Checking your nose for nasal polyps.  ? Viewing your sinuses using a device that has a light (endoscope).  ? Testing for allergies or bacteria.  ? Imaging tests, such as an MRI or CT scan.  In rare cases, a bone biopsy may be done to rule out more serious types of fungal sinus disease.  How is this treated?  Treatment for sinusitis depends on the cause and whether your condition is chronic or acute.  · If caused by a virus, your symptoms should go away on their own within 10 days. You may be given medicines to relieve symptoms. They include:  ? Medicines that shrink swollen nasal passages (topical intranasal decongestants).  ? Medicines that treat allergies (antihistamines).  ? A spray that eases inflammation of the nostrils (topical intranasal corticosteroids).  ? Rinses that help get rid of thick mucus in your nose (nasal saline washes).  · If caused by bacteria, your health care provider may recommend waiting to see if your symptoms improve. Most bacterial infections will get better without antibiotic medicine. You may be given antibiotics if you have:  ? A severe infection.  ? A weak immune system.  · If caused by narrow nasal passages or nasal polyps, you may need to have surgery.  Follow these instructions at home:  Medicines  · Take, use, or apply over-the-counter and prescription medicines only as told by your health care provider. These may include nasal sprays.  · If you were prescribed an antibiotic medicine, take it as told by your health care provider. Do not stop taking the antibiotic even if you start to feel better.  Hydrate and humidify    · Drink enough fluid to keep your urine pale yellow. Staying hydrated will help to thin your mucus.  · Use a cool mist humidifier to keep the humidity level in your home above 50%.  · Inhale steam for 10-15 minutes, 3-4 times a day, or as told by your health care provider. You can do this in the bathroom while a hot shower is  running.  · Limit your exposure to cool or dry air.  Rest  · Rest as much as possible.  · Sleep with your head raised (elevated).  · Make sure you get enough sleep each night.  General instructions    · Apply a warm, moist washcloth to your face 3-4 times a day or as told by your health care provider. This will help with discomfort.  · Wash your hands often with soap and water to reduce your exposure to germs. If soap and water are not available, use hand .  · Do not smoke. Avoid being around people who are smoking (secondhand smoke).  · Keep all follow-up visits as told by your health care provider. This is important.  Contact a health care provider if:  · You have a fever.  · Your symptoms get worse.  · Your symptoms do not improve within 10 days.  Get help right away if:  · You have a severe headache.  · You have persistent vomiting.  · You have severe pain or swelling around your face or eyes.  · You have vision problems.  · You develop confusion.  · Your neck is stiff.  · You have trouble breathing.  Summary  · Sinusitis is soreness and inflammation of your sinuses. Sinuses are hollow spaces in the bones around your face.  · This condition is caused by nasal tissues that become inflamed or swollen. The swelling traps or blocks the flow of mucus. This allows bacteria, viruses, and fungi to grow, which leads to infection.  · If you were prescribed an antibiotic medicine, take it as told by your health care provider. Do not stop taking the antibiotic even if you start to feel better.  · Keep all follow-up visits as told by your health care provider. This is important.  This information is not intended to replace advice given to you by your health care provider. Make sure you discuss any questions you have with your health care provider.  Document Revised: 05/20/2019 Document Reviewed: 05/20/2019  [employer] Patient Education © 2021 [employer]

## 2021-09-08 DIAGNOSIS — I10 ESSENTIAL HYPERTENSION: ICD-10-CM

## 2021-09-08 RX ORDER — LOSARTAN POTASSIUM 50 MG/1
TABLET ORAL
Qty: 180 TABLET | Refills: 1 | Status: SHIPPED | OUTPATIENT
Start: 2021-09-08 | End: 2022-03-10 | Stop reason: SDUPTHER

## 2021-09-12 DIAGNOSIS — E11.9 TYPE 2 DIABETES MELLITUS WITHOUT COMPLICATION, WITH LONG-TERM CURRENT USE OF INSULIN (HCC): Chronic | ICD-10-CM

## 2021-09-12 DIAGNOSIS — Z79.4 TYPE 2 DIABETES MELLITUS WITHOUT COMPLICATION, WITH LONG-TERM CURRENT USE OF INSULIN (HCC): Chronic | ICD-10-CM

## 2021-09-13 RX ORDER — INSULIN GLARGINE 300 U/ML
16 INJECTION, SOLUTION SUBCUTANEOUS
Qty: 5 ML | Refills: 1 | Status: SHIPPED | OUTPATIENT
Start: 2021-09-13 | End: 2022-07-24 | Stop reason: SDUPTHER

## 2021-09-21 ENCOUNTER — OFFICE VISIT (OUTPATIENT)
Dept: INTERNAL MEDICINE | Facility: CLINIC | Age: 61
End: 2021-09-21

## 2021-09-21 VITALS
SYSTOLIC BLOOD PRESSURE: 130 MMHG | DIASTOLIC BLOOD PRESSURE: 100 MMHG | TEMPERATURE: 97.7 F | OXYGEN SATURATION: 97 % | RESPIRATION RATE: 18 BRPM | HEART RATE: 88 BPM | WEIGHT: 208 LBS | BODY MASS INDEX: 29.84 KG/M2

## 2021-09-21 DIAGNOSIS — Z12.11 COLON CANCER SCREENING: ICD-10-CM

## 2021-09-21 DIAGNOSIS — K64.4 EXTERNAL HEMORRHOID: ICD-10-CM

## 2021-09-21 DIAGNOSIS — E11.9 TYPE 2 DIABETES MELLITUS WITHOUT COMPLICATION, WITH LONG-TERM CURRENT USE OF INSULIN (HCC): Primary | ICD-10-CM

## 2021-09-21 DIAGNOSIS — I10 ESSENTIAL HYPERTENSION: ICD-10-CM

## 2021-09-21 DIAGNOSIS — E78.2 MIXED HYPERLIPIDEMIA: ICD-10-CM

## 2021-09-21 DIAGNOSIS — Z79.4 TYPE 2 DIABETES MELLITUS WITHOUT COMPLICATION, WITH LONG-TERM CURRENT USE OF INSULIN (HCC): Primary | ICD-10-CM

## 2021-09-21 PROCEDURE — 99214 OFFICE O/P EST MOD 30 MIN: CPT | Performed by: NURSE PRACTITIONER

## 2021-09-21 RX ORDER — HYDROCORTISONE ACETATE 25 MG/1
25 SUPPOSITORY RECTAL 2 TIMES DAILY PRN
Qty: 20 SUPPOSITORY | Refills: 0 | Status: SHIPPED | OUTPATIENT
Start: 2021-09-21 | End: 2022-03-11

## 2021-09-21 RX ORDER — DOXYCYCLINE HYCLATE 100 MG/1
100 CAPSULE ORAL 2 TIMES DAILY
Qty: 20 CAPSULE | Refills: 0 | Status: SHIPPED | OUTPATIENT
Start: 2021-09-21 | End: 2021-10-06

## 2021-10-05 ENCOUNTER — PATIENT MESSAGE (OUTPATIENT)
Dept: INTERNAL MEDICINE | Facility: CLINIC | Age: 61
End: 2021-10-05

## 2021-10-05 PROBLEM — Z86.19 HISTORY OF HEPATITIS A: Status: ACTIVE | Noted: 2021-10-05

## 2021-12-09 DIAGNOSIS — E11.649 CONTROLLED TYPE 2 DIABETES MELLITUS WITH HYPOGLYCEMIA, WITH LONG-TERM CURRENT USE OF INSULIN (HCC): ICD-10-CM

## 2021-12-09 DIAGNOSIS — Z79.4 CONTROLLED TYPE 2 DIABETES MELLITUS WITH HYPOGLYCEMIA, WITH LONG-TERM CURRENT USE OF INSULIN (HCC): ICD-10-CM

## 2022-01-13 ENCOUNTER — LAB (OUTPATIENT)
Dept: LAB | Facility: HOSPITAL | Age: 62
End: 2022-01-13

## 2022-01-13 ENCOUNTER — LAB (OUTPATIENT)
Dept: INTERNAL MEDICINE | Facility: CLINIC | Age: 62
End: 2022-01-13

## 2022-01-13 DIAGNOSIS — Z79.4 TYPE 2 DIABETES MELLITUS WITHOUT COMPLICATION, WITH LONG-TERM CURRENT USE OF INSULIN: Primary | ICD-10-CM

## 2022-01-13 DIAGNOSIS — I10 ESSENTIAL HYPERTENSION: ICD-10-CM

## 2022-01-13 DIAGNOSIS — E78.2 MIXED HYPERLIPIDEMIA: ICD-10-CM

## 2022-01-13 DIAGNOSIS — E11.9 TYPE 2 DIABETES MELLITUS WITHOUT COMPLICATION, WITH LONG-TERM CURRENT USE OF INSULIN: Primary | ICD-10-CM

## 2022-01-13 LAB
A/C: NORMAL
ALBUMIN SERPL-MCNC: 4.6 G/DL (ref 3.5–5.2)
ALBUMIN/GLOB SERPL: 2.2 G/DL
ALP SERPL-CCNC: 57 U/L (ref 39–117)
ALT SERPL W P-5'-P-CCNC: 27 U/L (ref 1–41)
ANION GAP SERPL CALCULATED.3IONS-SCNC: 10 MMOL/L (ref 5–15)
AST SERPL-CCNC: 19 U/L (ref 1–40)
BASOPHILS # BLD AUTO: 0.03 10*3/MM3 (ref 0–0.2)
BASOPHILS NFR BLD AUTO: 0.5 % (ref 0–1.5)
BILIRUB BLD-MCNC: NEGATIVE MG/DL
BILIRUB SERPL-MCNC: 1.1 MG/DL (ref 0–1.2)
BUN SERPL-MCNC: 12 MG/DL (ref 8–23)
BUN/CREAT SERPL: 11.7 (ref 7–25)
CALCIUM SPEC-SCNC: 9.5 MG/DL (ref 8.6–10.5)
CHLORIDE SERPL-SCNC: 102 MMOL/L (ref 98–107)
CHOLEST SERPL-MCNC: 194 MG/DL (ref 0–200)
CLARITY, POC: CLEAR
CO2 SERPL-SCNC: 28 MMOL/L (ref 22–29)
COLOR UR: YELLOW
CREAT SERPL-MCNC: 1.03 MG/DL (ref 0.76–1.27)
DEPRECATED RDW RBC AUTO: 40.5 FL (ref 37–54)
EOSINOPHIL # BLD AUTO: 0.11 10*3/MM3 (ref 0–0.4)
EOSINOPHIL NFR BLD AUTO: 2 % (ref 0.3–6.2)
ERYTHROCYTE [DISTWIDTH] IN BLOOD BY AUTOMATED COUNT: 12.2 % (ref 12.3–15.4)
EXPIRATION DATE: ABNORMAL
EXPIRATION DATE: NORMAL
GFR SERPL CREATININE-BSD FRML MDRD: 73 ML/MIN/1.73
GLOBULIN UR ELPH-MCNC: 2.1 GM/DL
GLUCOSE SERPL-MCNC: 137 MG/DL (ref 65–99)
GLUCOSE UR STRIP-MCNC: ABNORMAL MG/DL
HBA1C MFR BLD: 7.6 % (ref 4.8–5.6)
HCT VFR BLD AUTO: 49.8 % (ref 37.5–51)
HDLC SERPL-MCNC: 62 MG/DL (ref 40–60)
HGB BLD-MCNC: 16.9 G/DL (ref 13–17.7)
IMM GRANULOCYTES # BLD AUTO: 0.01 10*3/MM3 (ref 0–0.05)
IMM GRANULOCYTES NFR BLD AUTO: 0.2 % (ref 0–0.5)
KETONES UR QL: NEGATIVE
LDLC SERPL CALC-MCNC: 122 MG/DL (ref 0–100)
LDLC/HDLC SERPL: 1.95 {RATIO}
LEUKOCYTE EST, POC: NEGATIVE
LYMPHOCYTES # BLD AUTO: 1.24 10*3/MM3 (ref 0.7–3.1)
LYMPHOCYTES NFR BLD AUTO: 22.2 % (ref 19.6–45.3)
Lab: ABNORMAL
Lab: NORMAL
MCH RBC QN AUTO: 31.2 PG (ref 26.6–33)
MCHC RBC AUTO-ENTMCNC: 33.9 G/DL (ref 31.5–35.7)
MCV RBC AUTO: 91.9 FL (ref 79–97)
MONOCYTES # BLD AUTO: 0.48 10*3/MM3 (ref 0.1–0.9)
MONOCYTES NFR BLD AUTO: 8.6 % (ref 5–12)
NEUTROPHILS NFR BLD AUTO: 3.71 10*3/MM3 (ref 1.7–7)
NEUTROPHILS NFR BLD AUTO: 66.5 % (ref 42.7–76)
NITRITE UR-MCNC: NEGATIVE MG/ML
NRBC BLD AUTO-RTO: 0 /100 WBC (ref 0–0.2)
PH UR: 6.5 [PH] (ref 5–8)
PLATELET # BLD AUTO: 241 10*3/MM3 (ref 140–450)
PMV BLD AUTO: 9.3 FL (ref 6–12)
POC CREATININE URINE: NORMAL
POC MICROALBUMIN URINE: NORMAL
POTASSIUM SERPL-SCNC: 4.8 MMOL/L (ref 3.5–5.2)
PROT SERPL-MCNC: 6.7 G/DL (ref 6–8.5)
PROT UR STRIP-MCNC: NEGATIVE MG/DL
RBC # BLD AUTO: 5.42 10*6/MM3 (ref 4.14–5.8)
RBC # UR STRIP: NEGATIVE /UL
SODIUM SERPL-SCNC: 140 MMOL/L (ref 136–145)
SP GR UR: 1.01 (ref 1–1.03)
TRIGL SERPL-MCNC: 55 MG/DL (ref 0–150)
UROBILINOGEN UR QL: NORMAL
VLDLC SERPL-MCNC: 10 MG/DL (ref 5–40)
WBC NRBC COR # BLD: 5.58 10*3/MM3 (ref 3.4–10.8)

## 2022-01-13 PROCEDURE — 85025 COMPLETE CBC W/AUTO DIFF WBC: CPT | Performed by: NURSE PRACTITIONER

## 2022-01-13 PROCEDURE — 82044 UR ALBUMIN SEMIQUANTITATIVE: CPT | Performed by: NURSE PRACTITIONER

## 2022-01-13 PROCEDURE — 81003 URINALYSIS AUTO W/O SCOPE: CPT | Performed by: NURSE PRACTITIONER

## 2022-01-13 PROCEDURE — 80061 LIPID PANEL: CPT | Performed by: NURSE PRACTITIONER

## 2022-01-13 PROCEDURE — 83036 HEMOGLOBIN GLYCOSYLATED A1C: CPT | Performed by: NURSE PRACTITIONER

## 2022-01-13 PROCEDURE — 36415 COLL VENOUS BLD VENIPUNCTURE: CPT

## 2022-01-13 PROCEDURE — 80053 COMPREHEN METABOLIC PANEL: CPT | Performed by: NURSE PRACTITIONER

## 2022-01-14 DIAGNOSIS — Z79.4 CONTROLLED TYPE 2 DIABETES MELLITUS WITH HYPOGLYCEMIA, WITH LONG-TERM CURRENT USE OF INSULIN: ICD-10-CM

## 2022-01-14 DIAGNOSIS — E11.649 CONTROLLED TYPE 2 DIABETES MELLITUS WITH HYPOGLYCEMIA, WITH LONG-TERM CURRENT USE OF INSULIN: ICD-10-CM

## 2022-01-18 DIAGNOSIS — R06.83 SNORING: Primary | ICD-10-CM

## 2022-03-10 DIAGNOSIS — I10 ESSENTIAL HYPERTENSION: ICD-10-CM

## 2022-03-10 RX ORDER — LOSARTAN POTASSIUM 50 MG/1
100 TABLET ORAL DAILY
Qty: 180 TABLET | Refills: 1 | Status: SHIPPED | OUTPATIENT
Start: 2022-03-10 | End: 2022-08-01

## 2022-03-11 ENCOUNTER — OFFICE VISIT (OUTPATIENT)
Dept: ENDOCRINOLOGY | Facility: CLINIC | Age: 62
End: 2022-03-11

## 2022-03-11 VITALS
SYSTOLIC BLOOD PRESSURE: 128 MMHG | DIASTOLIC BLOOD PRESSURE: 84 MMHG | HEART RATE: 96 BPM | OXYGEN SATURATION: 96 % | WEIGHT: 208.8 LBS | BODY MASS INDEX: 29.96 KG/M2

## 2022-03-11 DIAGNOSIS — E11.65 UNCONTROLLED TYPE 2 DIABETES MELLITUS WITH HYPERGLYCEMIA: Primary | Chronic | ICD-10-CM

## 2022-03-11 DIAGNOSIS — E78.2 MIXED HYPERLIPIDEMIA: Chronic | ICD-10-CM

## 2022-03-11 LAB
EXPIRATION DATE: NORMAL
GLUCOSE BLDC GLUCOMTR-MCNC: 153 MG/DL (ref 70–130)
HBA1C MFR BLD: 7.2 %
Lab: NORMAL

## 2022-03-11 PROCEDURE — 83036 HEMOGLOBIN GLYCOSYLATED A1C: CPT | Performed by: PHYSICIAN ASSISTANT

## 2022-03-11 PROCEDURE — 82947 ASSAY GLUCOSE BLOOD QUANT: CPT | Performed by: PHYSICIAN ASSISTANT

## 2022-03-11 PROCEDURE — 99214 OFFICE O/P EST MOD 30 MIN: CPT | Performed by: PHYSICIAN ASSISTANT

## 2022-03-11 NOTE — PROGRESS NOTES
Chief Complaint  F/u for Diabetes Mellitus.    HPI   Sancho Salazar is a 62 y.o. male who is here today for f/u of Diabetes Mellitus type 2. The initial diagnosis of diabetes was made 2016.    Pt reports to be doing well.  Has been exercising since last visit. Rides bike and sometimes jogs.     A1C- 6.4 (6/21/21), 7.1 (11/4/2020), 6.8 (2/26/2020), 6.5 (8/16/19), 10.9 (5/17/19)    Diabetic complications: none  Eye exam current (within one year): updated 8/2021  Foot care and dental care: discussed    Current diabetic medications include:  Jardiance 10 mg daily - had frequent urination with higher dose  Toujeo 18 u qhs.     Statin: refused in the past    Past medications: metformin (severe abdominal pain)    Diabetic Monitoring  -   Meter downloaded and reviewed- testing BG 0-1 times per day, fasting -131, no hypoglycemia     The following portions of the patient's history were reviewed and updated by me as appropriate: allergies, current medications, past family history, past social history, past surgical history and problem list.      Past Medical History:   Diagnosis Date   • Diabetes mellitus (HCC)    • Hypertension        Medications    Current Outpatient Medications:   •  empagliflozin (Jardiance) 10 MG tablet tablet, Take 1 tablet by mouth Daily., Disp: 90 tablet, Rfl: 1  •  Insulin Glargine, 1 Unit Dial, (Toujeo SoloStar) 300 UNIT/ML solution pen-injector injection, Inject 16 Units under the skin into the appropriate area as directed every night at bedtime., Disp: 5 mL, Rfl: 1  •  Insulin Pen Needle (B-D UF III MINI PEN NEEDLES) 31G X 5 MM misc, USE DAILY WITH INSULIN INJECTIONS, Disp: 100 each, Rfl: 3  •  losartan (COZAAR) 50 MG tablet, Take 2 tablets by mouth Daily., Disp: 180 tablet, Rfl: 1    Review of Systems  Review of Systems   Constitutional: Negative for chills, fatigue, fever and unexpected weight change.   HENT: Negative for ear discharge, ear pain, hearing loss, nosebleeds, rhinorrhea and sore  throat.    Eyes: Negative for pain, redness and visual disturbance.   Respiratory: Negative for cough, shortness of breath and wheezing.    Cardiovascular: Negative for chest pain, palpitations and leg swelling.   Gastrointestinal: Negative for abdominal pain, blood in stool, constipation, diarrhea, nausea and vomiting.   Endocrine: Negative for cold intolerance, heat intolerance and polydipsia.   Genitourinary: Negative for difficulty urinating, dysuria, hematuria, penile discharge, penile pain, penile swelling, scrotal swelling, testicular pain and urgency.   Musculoskeletal: Positive for arthralgias. Negative for gait problem, joint swelling and myalgias.   Skin: Negative for rash.   Allergic/Immunologic: Negative.    Neurological: Positive for tremors (chronic). Negative for dizziness, syncope, weakness, numbness and headaches.   Hematological: Negative for adenopathy. Does not bruise/bleed easily.   Psychiatric/Behavioral: Negative for sleep disturbance and suicidal ideas. The patient is not nervous/anxious.         Physical Exam    /84   Pulse 96   Wt 94.7 kg (208 lb 12.8 oz)   SpO2 96%   BMI 29.96 kg/m² Body mass index is 29.96 kg/m².  Physical Exam   Constitutional: He is oriented to person, place, and time. He appears well-developed. No distress.   HENT:   Head: Normocephalic.   Right Ear: External ear normal.   Left Ear: External ear normal.   Nose: Nose normal.   Eyes: Conjunctivae are normal. Right eye exhibits no discharge. Left eye exhibits no discharge. No scleral icterus.   Neck: No JVD present. No tracheal deviation present. No thyromegaly present.   Cardiovascular: Normal rate, regular rhythm and normal heart sounds.   No murmur heard.  Pulmonary/Chest: Effort normal and breath sounds normal. No respiratory distress. He has no wheezes.   Abdominal: Soft. Bowel sounds are normal. There is no abdominal tenderness.   Musculoskeletal: No tenderness.    Sancho had a diabetic foot exam performed  today.   During the foot exam he had a monofilament test performed.    Neurological Sensory Findings - Unaltered hot/cold right ankle/foot discrimination and unaltered hot/cold left ankle/foot discrimination. Unaltered sharp/dull right ankle/foot discrimination and unaltered sharp/dull left ankle/foot discrimination. No right ankle/foot altered proprioception and no left ankle/foot altered proprioception  Vascular Status -  His right foot exhibits normal foot vasculature  and no edema. His left foot exhibits normal foot vasculature  and no edema.  Skin Integrity  -  His right foot skin is intact.  He has no right foot onychomycosis, no right foot ulcer and non-callous right foot.His left foot skin is intact. He has no left foot onychomycosis, no left foot ulcer and non-callous left foot..  Neurological: He is alert and oriented to person, place, and time.   Bilateral hand tremor   Skin: Skin is warm and dry. No rash noted. He is not diaphoretic. No erythema.   Psychiatric: His behavior is normal. Judgment and thought content normal.         Labs and Imaging   Lab Results   Component Value Date    HGBA1C 7.2 03/11/2022    HGBA1C 7.60 (H) 01/13/2022    HGBA1C 6.4 06/21/2021     Office Visit on 03/11/2022   Component Date Value Ref Range Status   • Hemoglobin A1C 03/11/2022 7.2  % Final   • Lot Number 03/11/2022 [id number]   Final   • Expiration Date 03/11/2022 [id number]   Final   • Glucose 03/11/2022 153 (A) 70 - 130 mg/dL Final     POC Microalbumin (01/13/2022 10:43)  Hemoglobin A1c (01/13/2022 08:49)  Lipid Panel (01/13/2022 08:49)  Comprehensive Metabolic Panel (01/13/2022 08:49)    Assessment / Plan   Diagnoses and all orders for this visit:    1. Uncontrolled type 2 diabetes mellitus with hyperglycemia (HCC) (Primary)  -     POC Glycosylated Hemoglobin (Hb A1C)  -     POC Glucose, Blood  -     empagliflozin (Jardiance) 10 MG tablet tablet; Take 1 tablet by mouth Daily.  Dispense: 90 tablet; Refill: 1    2. Mixed  hyperlipidemia      Diabetes Mellitus 2 is under fair control.  -A1c 7.2, up from 6.4 last visit  -increase toujeo to 20 u qhs   -discussed possibility of changing toujeo to glp-1 agonist, but pt would like to continue current regimen   -continue jardiance 10 mg daily  -work on diet and exercise    -eye exam updated 8/2021, labs utd 1/2022, foot exam updated today    1.  Diet: 3-4 carb servings per meal for females, 4-5 carb servings per meal for males  Spread carb intake throughout the day  Increase lean protein and vegetable intake  Avoid sugary drinks and processed carbs including crackers, cookies, cakes  2.  Exercise: Recommend at least 30 minutes of exercise daily, at least 5 days per week. Increase exercise gradually.   3.  Blood Glucose Goal: Blood glucose goal <150 fasting, <180 2 hr postprandial  4.  Microalbumin due 1/2023  5.  Education performed during this visit: long term diabetic complications discussed. , annual eye examinations at Ophthal[facility] discussed, dental hygiene discussed  and foot care reviewed., home glucose monitoring emphasized, all medications, side effects and compliance discussed carefully and Hypoglycemia management and prevention reviewed. Reviewed ‘ABCs’ of diabetes management (respective goals in parentheses):  A1C (<7), blood pressure (<130/80), and cholesterol (LDL <100, if CVD <70).    Hyperlipidemia  -LDL is not within guidelines  -discussed increased risk of CV events  -he will resume lipitor 40 mg he was at home     There are no Patient Instructions on file for this visit.    Follow up: Return in about 3 months (around 6/11/2022).    Discussed the nature of the disease including, risks, complications, implications, management, safe and proper use of medications. Encouraged therapeutic lifestyle changes including low calorie diet with plenty of fruits and vegetables, daily exercise, medication compliance, and keeping scheduled follow up appointments with me and any other  providers. Encouraged patient to have appointment for complete physical, fasting labs, appropriate screenings, and immunizations on an annual basis.        Javier Jewell PA-C

## 2022-03-14 DIAGNOSIS — E78.2 MIXED HYPERLIPIDEMIA: Primary | ICD-10-CM

## 2022-03-14 RX ORDER — ATORVASTATIN CALCIUM 40 MG/1
40 TABLET, FILM COATED ORAL DAILY
Qty: 90 TABLET | Refills: 1 | Status: SHIPPED | OUTPATIENT
Start: 2022-03-14 | End: 2022-08-01 | Stop reason: SDUPTHER

## 2022-03-25 ENCOUNTER — TELEMEDICINE (OUTPATIENT)
Dept: FAMILY MEDICINE CLINIC | Facility: TELEHEALTH | Age: 62
End: 2022-03-25

## 2022-03-25 DIAGNOSIS — J01.00 ACUTE MAXILLARY SINUSITIS, RECURRENCE NOT SPECIFIED: Primary | ICD-10-CM

## 2022-03-25 PROCEDURE — 99213 OFFICE O/P EST LOW 20 MIN: CPT | Performed by: NURSE PRACTITIONER

## 2022-03-25 RX ORDER — BENZONATATE 200 MG/1
200 CAPSULE ORAL 3 TIMES DAILY PRN
Qty: 21 CAPSULE | Refills: 0 | Status: SHIPPED | OUTPATIENT
Start: 2022-03-25 | End: 2022-04-12

## 2022-03-25 RX ORDER — DOXYCYCLINE 100 MG/1
100 CAPSULE ORAL 2 TIMES DAILY
Qty: 20 CAPSULE | Refills: 0 | Status: SHIPPED | OUTPATIENT
Start: 2022-03-25 | End: 2022-04-04

## 2022-03-25 NOTE — PATIENT INSTRUCTIONS
Sinusitis, Adult  Sinusitis is inflammation of your sinuses. Sinuses are hollow spaces in the bones around your face. Your sinuses are located:  Around your eyes.  In the middle of your forehead.  Behind your nose.  In your cheekbones.  Mucus normally drains out of your sinuses. When your nasal tissues become inflamed or swollen, mucus can become trapped or blocked. This allows bacteria, viruses, and fungi to grow, which leads to infection. Most infections of the sinuses are caused by a virus.  Sinusitis can develop quickly. It can last for up to 4 weeks (acute) or for more than 12 weeks (chronic). Sinusitis often develops after a cold.  What are the causes?  This condition is caused by anything that creates swelling in the sinuses or stops mucus from draining. This includes:  Allergies.  Asthma.  Infection from bacteria or viruses.  Deformities or blockages in your nose or sinuses.  Abnormal growths in the nose (nasal polyps).  Pollutants, such as chemicals or irritants in the air.  Infection from fungi (rare).  What increases the risk?  You are more likely to develop this condition if you:  Have a weak body defense system (immune system).  Do a lot of swimming or diving.  Overuse nasal sprays.  Smoke.  What are the signs or symptoms?  The main symptoms of this condition are pain and a feeling of pressure around the affected sinuses. Other symptoms include:  Stuffy nose or congestion.  Thick drainage from your nose.  Swelling and warmth over the affected sinuses.  Headache.  Upper toothache.  A cough that may get worse at night.  Extra mucus that collects in the throat or the back of the nose (postnasal drip).  Decreased sense of smell and taste.  Fatigue.  A fever.  Sore throat.  Bad breath.  How is this diagnosed?  This condition is diagnosed based on:  Your symptoms.  Your medical history.  A physical exam.  Tests to find out if your condition is acute or chronic. This may include:  Checking your nose for nasal  polyps.  Viewing your sinuses using a device that has a light (endoscope).  Testing for allergies or bacteria.  Imaging tests, such as an MRI or CT scan.  In rare cases, a bone biopsy may be done to rule out more serious types of fungal sinus disease.  How is this treated?  Treatment for sinusitis depends on the cause and whether your condition is chronic or acute.  If caused by a virus, your symptoms should go away on their own within 10 days. You may be given medicines to relieve symptoms. They include:  Medicines that shrink swollen nasal passages (topical intranasal decongestants).  Medicines that treat allergies (antihistamines).  A spray that eases inflammation of the nostrils (topical intranasal corticosteroids).  Rinses that help get rid of thick mucus in your nose (nasal saline washes).  If caused by bacteria, your health care provider may recommend waiting to see if your symptoms improve. Most bacterial infections will get better without antibiotic medicine. You may be given antibiotics if you have:  A severe infection.  A weak immune system.  If caused by narrow nasal passages or nasal polyps, you may need to have surgery.  Follow these instructions at home:  Medicines  Take, use, or apply over-the-counter and prescription medicines only as told by your health care provider. These may include nasal sprays.  If you were prescribed an antibiotic medicine, take it as told by your health care provider. Do not stop taking the antibiotic even if you start to feel better.  Hydrate and humidify    Drink enough fluid to keep your urine pale yellow. Staying hydrated will help to thin your mucus.  Use a cool mist humidifier to keep the humidity level in your home above 50%.  Inhale steam for 10-15 minutes, 3-4 times a day, or as told by your health care provider. You can do this in the bathroom while a hot shower is running.  Limit your exposure to cool or dry air.    Rest  Rest as much as possible.  Sleep with your  head raised (elevated).  Make sure you get enough sleep each night.  General instructions    Apply a warm, moist washcloth to your face 3-4 times a day or as told by your health care provider. This will help with discomfort.  Wash your hands often with soap and water to reduce your exposure to germs. If soap and water are not available, use hand .  Do not smoke. Avoid being around people who are smoking (secondhand smoke).  Keep all follow-up visits as told by your health care provider. This is important.    Contact a health care provider if:  You have a fever.  Your symptoms get worse.  Your symptoms do not improve within 10 days.  Get help right away if:  You have a severe headache.  You have persistent vomiting.  You have severe pain or swelling around your face or eyes.  You have vision problems.  You develop confusion.  Your neck is stiff.  You have trouble breathing.  Summary  Sinusitis is soreness and inflammation of your sinuses. Sinuses are hollow spaces in the bones around your face.  This condition is caused by nasal tissues that become inflamed or swollen. The swelling traps or blocks the flow of mucus. This allows bacteria, viruses, and fungi to grow, which leads to infection.  If you were prescribed an antibiotic medicine, take it as told by your health care provider. Do not stop taking the antibiotic even if you start to feel better.  Keep all follow-up visits as told by your health care provider. This is important.  This information is not intended to replace advice given to you by your health care provider. Make sure you discuss any questions you have with your health care provider.  Document Revised: 05/20/2019 Document Reviewed: 05/20/2019  [employer] (app) Patient Education © 2021 [employer]

## 2022-03-25 NOTE — PROGRESS NOTES
You have chosen to receive care through a telehealth visit.  Do you consent to use a video/audio connection for your medical care today? Yes     CHIEF COMPLAINT  Chief Complaint   Patient presents with   • Sinusitis         HPI  Sancho Salazar is a 62 y.o. male  presents with complaint of having a cold 2 weeks ago, now having nasal congestion with yellow/green discharge, ears are itchy, pressure behind eyes, PND, cough began 2 days ago, body aches and fatigue.  Denies fever, chills, wheezing, shortness of breath.  Gets a sinus infection around this time every year.     At home COVID test was negative yesterday    Review of Systems   Constitutional: Positive for fatigue. Negative for chills and fever.   HENT: Positive for congestion, ear pain, postnasal drip, sinus pressure and sinus pain. Negative for sore throat.    Respiratory: Positive for cough. Negative for chest tightness, shortness of breath and wheezing.    Musculoskeletal: Positive for myalgias.   All other systems reviewed and are negative.      Past Medical History:   Diagnosis Date   • Diabetes mellitus (HCC)    • Hypertension        Family History   Problem Relation Age of Onset   • Hypertension Mother    • Hypertension Father        Social History     Socioeconomic History   • Marital status:    Tobacco Use   • Smoking status: Never Smoker   • Smokeless tobacco: Never Used   Substance and Sexual Activity   • Alcohol use: No   • Drug use: No   • Sexual activity: Defer         There were no vitals taken for this visit.    PHYSICAL EXAM  Physical Exam   Constitutional: He is oriented to person, place, and time. He appears well-developed and well-nourished. He does not have a sickly appearance. He does not appear ill.   HENT:   Head: Normocephalic and atraumatic.   Maxillary sinus tenderness   Pulmonary/Chest: Effort normal.  No respiratory distress.  Neurological: He is alert and oriented to person, place, and time.         Diagnoses and all orders  for this visit:    1. Acute maxillary sinusitis, recurrence not specified (Primary)  -     doxycycline (MONODOX) 100 MG capsule; Take 1 capsule by mouth 2 (Two) Times a Day for 10 days.  Dispense: 20 capsule; Refill: 0  -     benzonatate (TESSALON) 200 MG capsule; Take 1 capsule by mouth 3 (Three) Times a Day As Needed for Cough.  Dispense: 21 capsule; Refill: 0    --take medications as prescribed  --increase fluids and rest  --f/u in 3-5 days if no improvement      FOLLOW-UP  As discussed during visit with PCP/[facility] if no improvement or Urgent Care/Emergency Department if worsening of symptoms    Patient verbalizes understanding of medication dosage, comfort measures, instructions for treatment and follow-up.    HECTOR Toro  03/25/2022  07:21 EDT    This visit was performed via Telehealth.  This patient has been instructed to follow-up with their primary care provider if their symptoms worsen or the treatment provided does not resolve their illness.

## 2022-04-12 ENCOUNTER — TELEMEDICINE (OUTPATIENT)
Dept: FAMILY MEDICINE CLINIC | Facility: TELEHEALTH | Age: 62
End: 2022-04-12

## 2022-04-12 DIAGNOSIS — J01.01 ACUTE RECURRENT MAXILLARY SINUSITIS: Primary | ICD-10-CM

## 2022-04-12 PROCEDURE — 99213 OFFICE O/P EST LOW 20 MIN: CPT | Performed by: NURSE PRACTITIONER

## 2022-04-12 RX ORDER — CEFDINIR 300 MG/1
300 CAPSULE ORAL 2 TIMES DAILY
Qty: 20 CAPSULE | Refills: 0 | Status: SHIPPED | OUTPATIENT
Start: 2022-04-12 | End: 2022-04-22

## 2022-04-12 NOTE — PROGRESS NOTES
HPI  Sancho Salazar is a 62 y.o. male  presents with complaint of left maxillary sinus pressure, teeth pain, green nasal discharge. Was prescribed doxy on 3/25 for sinusitis which improved symptoms, but symptoms worsened again 3-4 days ago. Throat is mildly scratchy and ears feel plugged.    Denies fever, SOA, CP.    Tried neti pot yesterday but it did not improve symptoms. Also using flonase.    Review of Systems    Past Medical History:   Diagnosis Date   • Diabetes mellitus (HCC)    • Hypertension        Family History   Problem Relation Age of Onset   • Hypertension Mother    • Hypertension Father        Social History     Socioeconomic History   • Marital status:    Tobacco Use   • Smoking status: Never Smoker   • Smokeless tobacco: Never Used   Substance and Sexual Activity   • Alcohol use: No   • Drug use: No   • Sexual activity: Defer         There were no vitals taken for this visit.    PHYSICAL EXAM  Physical Exam   Constitutional: He appears well-developed and well-nourished.   HENT:   Head: Normocephalic.   Nose: Left sinus exhibits maxillary sinus tenderness.   Neck: Neck normal appearance.  Pulmonary/Chest: Effort normal.   Neurological: He is alert.   Psychiatric: He has a normal mood and affect. His speech is normal.       Diagnoses and all orders for this visit:    1. Acute recurrent maxillary sinusitis (Primary)  -     cefdinir (OMNICEF) 300 MG capsule; Take 1 capsule by mouth 2 (Two) Times a Day for 10 days.  Dispense: 20 capsule; Refill: 0          FOLLOW-UP  As discussed during visit with [facility], if symptoms worsen or fail to improve, follow-up with PCP/Urgent Care/Emergency Department.    Patient verbalizes understanding of medications, instructions for treatment and follow-up.    Leeanne Rao, APRN  04/12/2022  17:49 EDT    This visit was performed via Telehealth.  This patient has been instructed to follow-up with their primary care provider if their symptoms worsen or  the treatment provided does not resolve their illness.    Sancho Salazar verbally consented to a telehealth visit. Sancho Salazar was seen via telehealth using real-time video conferencing technology by [employer]ECT[employer]. Sacnho Martin was located at [place], [place], and [employer] Hernandez was located in [place], [place].

## 2022-04-12 NOTE — PATIENT INSTRUCTIONS
Sinusitis, Adult  Sinusitis is inflammation of your sinuses. Sinuses are hollow spaces in the bones around your face. Your sinuses are located:  Around your eyes.  In the middle of your forehead.  Behind your nose.  In your cheekbones.  Mucus normally drains out of your sinuses. When your nasal tissues become inflamed or swollen, mucus can become trapped or blocked. This allows bacteria, viruses, and fungi to grow, which leads to infection. Most infections of the sinuses are caused by a virus.  Sinusitis can develop quickly. It can last for up to 4 weeks (acute) or for more than 12 weeks (chronic). Sinusitis often develops after a cold.  What are the causes?  This condition is caused by anything that creates swelling in the sinuses or stops mucus from draining. This includes:  Allergies.  Asthma.  Infection from bacteria or viruses.  Deformities or blockages in your nose or sinuses.  Abnormal growths in the nose (nasal polyps).  Pollutants, such as chemicals or irritants in the air.  Infection from fungi (rare).  What increases the risk?  You are more likely to develop this condition if you:  Have a weak body defense system (immune system).  Do a lot of swimming or diving.  Overuse nasal sprays.  Smoke.  What are the signs or symptoms?  The main symptoms of this condition are pain and a feeling of pressure around the affected sinuses. Other symptoms include:  Stuffy nose or congestion.  Thick drainage from your nose.  Swelling and warmth over the affected sinuses.  Headache.  Upper toothache.  A cough that may get worse at night.  Extra mucus that collects in the throat or the back of the nose (postnasal drip).  Decreased sense of smell and taste.  Fatigue.  A fever.  Sore throat.  Bad breath.  How is this diagnosed?  This condition is diagnosed based on:  Your symptoms.  Your medical history.  A physical exam.  Tests to find out if your condition is acute or chronic. This may include:  Checking your nose for nasal  polyps.  Viewing your sinuses using a device that has a light (endoscope).  Testing for allergies or bacteria.  Imaging tests, such as an MRI or CT scan.  In rare cases, a bone biopsy may be done to rule out more serious types of fungal sinus disease.  How is this treated?  Treatment for sinusitis depends on the cause and whether your condition is chronic or acute.  If caused by a virus, your symptoms should go away on their own within 10 days. You may be given medicines to relieve symptoms. They include:  Medicines that shrink swollen nasal passages (topical intranasal decongestants).  Medicines that treat allergies (antihistamines).  A spray that eases inflammation of the nostrils (topical intranasal corticosteroids).  Rinses that help get rid of thick mucus in your nose (nasal saline washes).  If caused by bacteria, your health care provider may recommend waiting to see if your symptoms improve. Most bacterial infections will get better without antibiotic medicine. You may be given antibiotics if you have:  A severe infection.  A weak immune system.  If caused by narrow nasal passages or nasal polyps, you may need to have surgery.  Follow these instructions at home:  Medicines  Take, use, or apply over-the-counter and prescription medicines only as told by your health care provider. These may include nasal sprays.  If you were prescribed an antibiotic medicine, take it as told by your health care provider. Do not stop taking the antibiotic even if you start to feel better.  Hydrate and humidify    Drink enough fluid to keep your urine pale yellow. Staying hydrated will help to thin your mucus.  Use a cool mist humidifier to keep the humidity level in your home above 50%.  Inhale steam for 10-15 minutes, 3-4 times a day, or as told by your health care provider. You can do this in the bathroom while a hot shower is running.  Limit your exposure to cool or dry air.    Rest  Rest as much as possible.  Sleep with your  head raised (elevated).  Make sure you get enough sleep each night.  General instructions    Apply a warm, moist washcloth to your face 3-4 times a day or as told by your health care provider. This will help with discomfort.  Wash your hands often with soap and water to reduce your exposure to germs. If soap and water are not available, use hand .  Do not smoke. Avoid being around people who are smoking (secondhand smoke).  Keep all follow-up visits as told by your health care provider. This is important.    Contact a health care provider if:  You have a fever.  Your symptoms get worse.  Your symptoms do not improve within 10 days.  Get help right away if:  You have a severe headache.  You have persistent vomiting.  You have severe pain or swelling around your face or eyes.  You have vision problems.  You develop confusion.  Your neck is stiff.  You have trouble breathing.  Summary  Sinusitis is soreness and inflammation of your sinuses. Sinuses are hollow spaces in the bones around your face.  This condition is caused by nasal tissues that become inflamed or swollen. The swelling traps or blocks the flow of mucus. This allows bacteria, viruses, and fungi to grow, which leads to infection.  If you were prescribed an antibiotic medicine, take it as told by your health care provider. Do not stop taking the antibiotic even if you start to feel better.  Keep all follow-up visits as told by your health care provider. This is important.  This information is not intended to replace advice given to you by your health care provider. Make sure you discuss any questions you have with your health care provider.  Document Revised: 05/20/2019 Document Reviewed: 05/20/2019  [employer] Patient Education © 2021 [employer]

## 2022-07-22 ENCOUNTER — TELEMEDICINE (OUTPATIENT)
Dept: FAMILY MEDICINE CLINIC | Facility: TELEHEALTH | Age: 62
End: 2022-07-22

## 2022-07-22 DIAGNOSIS — J06.9 UPPER RESPIRATORY TRACT INFECTION, UNSPECIFIED TYPE: Primary | ICD-10-CM

## 2022-07-22 PROCEDURE — 99213 OFFICE O/P EST LOW 20 MIN: CPT | Performed by: NURSE PRACTITIONER

## 2022-07-22 RX ORDER — DOXYCYCLINE HYCLATE 100 MG/1
100 CAPSULE ORAL 2 TIMES DAILY
Qty: 14 CAPSULE | Refills: 0 | Status: SHIPPED | OUTPATIENT
Start: 2022-07-22 | End: 2022-07-29

## 2022-07-22 RX ORDER — BENZONATATE 200 MG/1
200 CAPSULE ORAL 3 TIMES DAILY PRN
Qty: 28 CAPSULE | Refills: 0 | Status: SHIPPED | OUTPATIENT
Start: 2022-07-22 | End: 2023-03-07

## 2022-07-22 RX ORDER — DEXTROMETHORPHAN HYDROBROMIDE AND PROMETHAZINE HYDROCHLORIDE 15; 6.25 MG/5ML; MG/5ML
5 SYRUP ORAL 4 TIMES DAILY PRN
Qty: 118 ML | Refills: 0 | Status: SHIPPED | OUTPATIENT
Start: 2022-07-22 | End: 2023-03-07

## 2022-07-22 NOTE — PATIENT INSTRUCTIONS
May continue over-the-counter medicines as needed.  Alternate tylenol and motrin for pain and/or fever, stay hydrated and rest.   Recommend repeating COVID test.  If symptoms worsen or do not improve follow up with your PCP or visit your nearest Urgent Care Center or ER.

## 2022-07-22 NOTE — PROGRESS NOTES
Subjective   Chief Complaint   Patient presents with   • URI       Sancho Salazar is a 62 y.o. male.     Patient reports right earache, cough that is worsening, congestion, scratchy throat, chills, body aches and headache for 4 days.  Cough is productive with white and green tinge sputum.  He can feel mucus in his chest when he takes a deep breath.  He took a COVID test 3 days ago that was negative.  He states he is prone to sinus infections and is a diabetic and wants to make sure he does not need antibiotics.    URI   This is a new problem. Episode onset: 4 days. The problem has been unchanged. There has been no fever. Associated symptoms include congestion, coughing, ear pain (right), headaches, sinus pain and a sore throat (scracthy throat). Pertinent negatives include no diarrhea, nausea, vomiting or wheezing. Treatments tried: sudafed, mucinex-D, delsym. The treatment provided mild relief.        Allergies   Allergen Reactions   • Erythromycin Rash   • Sulfa Antibiotics Rash       Past Medical History:   Diagnosis Date   • Diabetes mellitus (HCC)    • Hypertension        History reviewed. No pertinent surgical history.    Social History     Socioeconomic History   • Marital status:    Tobacco Use   • Smoking status: Never Smoker   • Smokeless tobacco: Never Used   Substance and Sexual Activity   • Alcohol use: No   • Drug use: No   • Sexual activity: Defer       Family History   Problem Relation Age of Onset   • Hypertension Mother    • Hypertension Father          Current Outpatient Medications:   •  atorvastatin (Lipitor) 40 MG tablet, Take 1 tablet by mouth Daily., Disp: 90 tablet, Rfl: 1  •  benzonatate (TESSALON) 200 MG capsule, Take 1 capsule by mouth 3 (Three) Times a Day As Needed for Cough., Disp: 28 capsule, Rfl: 0  •  doxycycline (VIBRAMYCIN) 100 MG capsule, Take 1 capsule by mouth 2 (Two) Times a Day for 7 days., Disp: 14 capsule, Rfl: 0  •  empagliflozin (Jardiance) 10 MG tablet tablet, Take 1  tablet by mouth Daily., Disp: 90 tablet, Rfl: 1  •  Insulin Glargine, 1 Unit Dial, (Toujeo SoloStar) 300 UNIT/ML solution pen-injector injection, Inject 16 Units under the skin into the appropriate area as directed every night at bedtime., Disp: 5 mL, Rfl: 1  •  Insulin Pen Needle (B-D UF III MINI PEN NEEDLES) 31G X 5 MM misc, USE DAILY WITH INSULIN INJECTIONS, Disp: 100 each, Rfl: 3  •  losartan (COZAAR) 50 MG tablet, Take 2 tablets by mouth Daily., Disp: 180 tablet, Rfl: 1  •  promethazine-dextromethorphan (PROMETHAZINE-DM) 6.25-15 MG/5ML syrup, Take 5 mL by mouth 4 (Four) Times a Day As Needed for Cough., Disp: 118 mL, Rfl: 0      Review of Systems   Constitutional: Positive for chills and fatigue. Negative for diaphoresis and fever.   HENT: Positive for congestion, ear pain (right), sinus pressure and sore throat (scracthy throat).    Respiratory: Positive for cough and chest tightness. Negative for shortness of breath and wheezing.    Gastrointestinal: Negative for diarrhea, nausea and vomiting.   Musculoskeletal: Positive for myalgias.   Neurological: Positive for headache.        There were no vitals filed for this visit.    Objective   Physical Exam  Constitutional:       General: He is not in acute distress.     Appearance: Normal appearance. He is not ill-appearing, toxic-appearing or diaphoretic.   HENT:      Head: Normocephalic.      Mouth/Throat:      Lips: Pink.      Mouth: Mucous membranes are moist.   Pulmonary:      Effort: Pulmonary effort is normal.   Neurological:      Mental Status: He is alert and oriented to person, place, and time.   Psychiatric:         Mood and Affect: Mood normal.         Behavior: Behavior normal.          Procedures     Assessment & Plan   Diagnoses and all orders for this visit:    1. Upper respiratory tract infection, unspecified type (Primary)  -     doxycycline (VIBRAMYCIN) 100 MG capsule; Take 1 capsule by mouth 2 (Two) Times a Day for 7 days.  Dispense: 14 capsule;  Refill: 0  -     benzonatate (TESSALON) 200 MG capsule; Take 1 capsule by mouth 3 (Three) Times a Day As Needed for Cough.  Dispense: 28 capsule; Refill: 0  -     promethazine-dextromethorphan (PROMETHAZINE-DM) 6.25-15 MG/5ML syrup; Take 5 mL by mouth 4 (Four) Times a Day As Needed for Cough.  Dispense: 118 mL; Refill: 0    May continue over-the-counter medicines as needed.  Alternate tylenol and motrin for pain and/or fever, stay hydrated and rest.   Recommend repeating COVID test.  If symptoms worsen or do not improve follow up with your PCP or visit your nearest Urgent Care Center or ER.        PLAN: Discussed dosing, side effects, recommended other symptomatic care.  Patient should follow up with primary care provider, Urgent Care or ER if symptoms worsen, fail to resolve or other symptoms need attention. Patient/family agree to the above.         HECTOR Holly     The use of a video visit has been reviewed with the patient and verbal informed consent has been obtained. Myself and Sancho Salazar participated in this visit. The patient is located at [address] [place], [place] [id number]. I am located in [place], [place]. Mychart and Zoom were utilized.        This visit was performed via Telehealth.  This patient has been instructed to follow-up with their primary care provider if their symptoms worsen or the treatment provided does not resolve their illness.

## 2022-07-24 DIAGNOSIS — E11.9 TYPE 2 DIABETES MELLITUS WITHOUT COMPLICATION, WITH LONG-TERM CURRENT USE OF INSULIN: Chronic | ICD-10-CM

## 2022-07-24 DIAGNOSIS — Z79.4 TYPE 2 DIABETES MELLITUS WITHOUT COMPLICATION, WITH LONG-TERM CURRENT USE OF INSULIN: Chronic | ICD-10-CM

## 2022-07-25 DIAGNOSIS — E11.9 TYPE 2 DIABETES MELLITUS WITHOUT COMPLICATION, WITH LONG-TERM CURRENT USE OF INSULIN: Chronic | ICD-10-CM

## 2022-07-25 DIAGNOSIS — Z79.4 TYPE 2 DIABETES MELLITUS WITHOUT COMPLICATION, WITH LONG-TERM CURRENT USE OF INSULIN: Chronic | ICD-10-CM

## 2022-07-25 RX ORDER — FLURBIPROFEN SODIUM 0.3 MG/ML
SOLUTION/ DROPS OPHTHALMIC
Qty: 100 EACH | Refills: 3 | Status: SHIPPED | OUTPATIENT
Start: 2022-07-25 | End: 2022-09-21 | Stop reason: SDUPTHER

## 2022-07-25 RX ORDER — FLURBIPROFEN SODIUM 0.3 MG/ML
SOLUTION/ DROPS OPHTHALMIC
Qty: 100 EACH | Refills: 3 | Status: CANCELLED | OUTPATIENT
Start: 2022-07-25

## 2022-07-25 RX ORDER — INSULIN GLARGINE 300 U/ML
20 INJECTION, SOLUTION SUBCUTANEOUS
Qty: 15 ML | Refills: 1 | Status: SHIPPED | OUTPATIENT
Start: 2022-07-25 | End: 2022-09-21 | Stop reason: SDUPTHER

## 2022-07-25 RX ORDER — INSULIN GLARGINE 300 U/ML
16 INJECTION, SOLUTION SUBCUTANEOUS
Qty: 15 ML | Refills: 1 | Status: CANCELLED | OUTPATIENT
Start: 2022-07-25

## 2022-08-01 ENCOUNTER — TELEMEDICINE (OUTPATIENT)
Dept: ENDOCRINOLOGY | Facility: CLINIC | Age: 62
End: 2022-08-01

## 2022-08-01 DIAGNOSIS — I10 ESSENTIAL HYPERTENSION: Chronic | ICD-10-CM

## 2022-08-01 DIAGNOSIS — E78.2 MIXED HYPERLIPIDEMIA: Chronic | ICD-10-CM

## 2022-08-01 DIAGNOSIS — Z79.4 CONTROLLED TYPE 2 DIABETES MELLITUS WITHOUT COMPLICATION, WITH LONG-TERM CURRENT USE OF INSULIN: Primary | Chronic | ICD-10-CM

## 2022-08-01 DIAGNOSIS — E11.9 CONTROLLED TYPE 2 DIABETES MELLITUS WITHOUT COMPLICATION, WITH LONG-TERM CURRENT USE OF INSULIN: Primary | Chronic | ICD-10-CM

## 2022-08-01 PROCEDURE — 99214 OFFICE O/P EST MOD 30 MIN: CPT | Performed by: PHYSICIAN ASSISTANT

## 2022-08-01 RX ORDER — ATORVASTATIN CALCIUM 40 MG/1
40 TABLET, FILM COATED ORAL DAILY
Qty: 90 TABLET | Refills: 1 | Status: SHIPPED | OUTPATIENT
Start: 2022-08-01 | End: 2023-02-14

## 2022-08-01 RX ORDER — LOSARTAN POTASSIUM 100 MG/1
100 TABLET ORAL DAILY
Qty: 90 TABLET | Refills: 1 | Status: SHIPPED | OUTPATIENT
Start: 2022-08-01 | End: 2023-01-31

## 2022-08-01 RX ORDER — AMLODIPINE BESYLATE 5 MG/1
5 TABLET ORAL DAILY
Qty: 90 TABLET | Refills: 1 | Status: SHIPPED | OUTPATIENT
Start: 2022-08-01 | End: 2023-01-31

## 2022-08-01 NOTE — PROGRESS NOTES
Video visit conducted to comply with patient safety concerns in accordance with CDC recommendations for the COVID-19 pandemic.       Chief Complaint  F/u for Diabetes Mellitus.    HPI   Sancho Salazar is a 62 y.o. male who is here today for f/u of Diabetes Mellitus type 2. The initial diagnosis of diabetes was made 2016.    Has COVID-19 currently. Is starting to feel better. Taste has returned and fatigue is better.   BG was higher during illness, but they are improving. Fasting BG average was 129, which is higher for him. He had a few evening readings in the 180s.   Systolic BP has been higher for some time - averaging in the 130s.     Took prednisone after last visit for allergies.     Diabetic complications: none  Eye exam current (within one year): updated 8/2021    Current diabetic medications include:  Jardiance 10 mg daily - had frequent urination with higher dose  Toujeo 20 u qhs.     Statin: refused in the past    Past medications: metformin (severe abdominal pain)    Diabetic Monitoring  -   See above     The following portions of the patient's history were reviewed and updated by me as appropriate: allergies, current medications, past family history, past social history, past surgical history and problem list.    Past Medical History:   Diagnosis Date   • Diabetes mellitus (HCC)    • Hypertension        Medications    Current Outpatient Medications:   •  atorvastatin (Lipitor) 40 MG tablet, Take 1 tablet by mouth Daily., Disp: 90 tablet, Rfl: 1  •  empagliflozin (Jardiance) 10 MG tablet tablet, Take 1 tablet by mouth Daily., Disp: 90 tablet, Rfl: 1  •  amLODIPine (NORVASC) 5 MG tablet, Take 1 tablet by mouth Daily., Disp: 90 tablet, Rfl: 1  •  benzonatate (TESSALON) 200 MG capsule, Take 1 capsule by mouth 3 (Three) Times a Day As Needed for Cough., Disp: 28 capsule, Rfl: 0  •  Insulin Glargine, 1 Unit Dial, (Toujeo SoloStar) 300 UNIT/ML solution pen-injector injection, Inject 20 Units under the skin into the  appropriate area as directed every night at bedtime., Disp: 15 mL, Rfl: 1  •  Insulin Pen Needle (B-D UF III MINI PEN NEEDLES) 31G X 5 MM misc, USE DAILY WITH INSULIN INJECTIONS, Disp: 100 each, Rfl: 3  •  losartan (Cozaar) 100 MG tablet, Take 1 tablet by mouth Daily., Disp: 90 tablet, Rfl: 1  •  promethazine-dextromethorphan (PROMETHAZINE-DM) 6.25-15 MG/5ML syrup, Take 5 mL by mouth 4 (Four) Times a Day As Needed for Cough., Disp: 118 mL, Rfl: 0    Review of Systems  Review of Systems   Constitutional: Positive for fatigue.   All other systems reviewed and are negative.       Physical Exam    There were no vitals taken for this visit.There is no height or weight on file to calculate BMI.  Physical Exam   Constitutional: He is oriented to person, place, and time. He does not appear ill. No distress.   Abdominal: Normal appearance.   Neurological: He is alert and oriented to person, place, and time.   Psychiatric: Mood and thought content normal.   Limited exam due video/phone visit    Labs and Imaging   Lab Results   Component Value Date    HGBA1C 7.2 03/11/2022    HGBA1C 7.60 (H) 01/13/2022    HGBA1C 6.4 06/21/2021     No visits with results within 1 Month(s) from this visit.   Latest known visit with results is:   Office Visit on 03/11/2022   Component Date Value Ref Range Status   • Hemoglobin A1C 03/11/2022 7.2  % Final   • Lot Number 03/11/2022 [id number]   Final   • Expiration Date 03/11/2022 [id number]   Final   • Glucose 03/11/2022 153 (A) 70 - 130 mg/dL Final     POC Microalbumin (01/13/2022 10:43)  Hemoglobin A1c (01/13/2022 08:49)  Lipid Panel (01/13/2022 08:49)  Comprehensive Metabolic Panel (01/13/2022 08:49)    Assessment / Plan   Diagnoses and all orders for this visit:    1. Controlled type 2 diabetes mellitus without complication, with long-term current use of insulin (HCC) (Primary)  -     Microalbumin / Creatinine Urine Ratio - Urine, Clean Catch; Future  -     Comprehensive Metabolic Panel;  Future  -     Lipid Panel; Future  -     TSH; Future  -     Hemoglobin A1c; Future  -     empagliflozin (Jardiance) 10 MG tablet tablet; Take 1 tablet by mouth Daily.  Dispense: 90 tablet; Refill: 1    2. Mixed hyperlipidemia  -     Comprehensive Metabolic Panel; Future  -     Lipid Panel; Future  -     atorvastatin (Lipitor) 40 MG tablet; Take 1 tablet by mouth Daily.  Dispense: 90 tablet; Refill: 1    3. Essential hypertension  -     losartan (Cozaar) 100 MG tablet; Take 1 tablet by mouth Daily.  Dispense: 90 tablet; Refill: 1  -     amLODIPine (NORVASC) 5 MG tablet; Take 1 tablet by mouth Daily.  Dispense: 90 tablet; Refill: 1      Diabetes Mellitus 2 is under fair control.  -a1c ordered - he will have labs done once he tests negative for covid-19  -continue toujeo 20 u qhs   -continue jardiance 10 mg daily  -eye exam updated 8/2021, labs ordered, foot exam updated 3/2022    Hyperlipidemia  -he is taking lipitor reguarly now  -order for lipids ordered    HTN  -averaging 130s systolic   -add amlodipine 5 mg daily, continue losartan 100 mg daily    There are no Patient Instructions on file for this visit.    Follow up: Return in about 3 months (around 11/1/2022).      Javier Jewell PA-C

## 2022-08-19 ENCOUNTER — LAB (OUTPATIENT)
Dept: LAB | Facility: HOSPITAL | Age: 62
End: 2022-08-19

## 2022-08-19 ENCOUNTER — LAB (OUTPATIENT)
Dept: ENDOCRINOLOGY | Facility: CLINIC | Age: 62
End: 2022-08-19

## 2022-08-19 DIAGNOSIS — E11.9 CONTROLLED TYPE 2 DIABETES MELLITUS WITHOUT COMPLICATION, WITH LONG-TERM CURRENT USE OF INSULIN: Chronic | ICD-10-CM

## 2022-08-19 DIAGNOSIS — Z79.4 CONTROLLED TYPE 2 DIABETES MELLITUS WITHOUT COMPLICATION, WITH LONG-TERM CURRENT USE OF INSULIN: Chronic | ICD-10-CM

## 2022-08-19 DIAGNOSIS — E78.2 MIXED HYPERLIPIDEMIA: Chronic | ICD-10-CM

## 2022-08-19 LAB
ALBUMIN SERPL-MCNC: 4.3 G/DL (ref 3.5–5.2)
ALBUMIN UR-MCNC: <1.2 MG/DL
ALBUMIN/GLOB SERPL: 2.4 G/DL
ALP SERPL-CCNC: 60 U/L (ref 39–117)
ALT SERPL W P-5'-P-CCNC: 40 U/L (ref 1–41)
ANION GAP SERPL CALCULATED.3IONS-SCNC: 9 MMOL/L (ref 5–15)
AST SERPL-CCNC: 22 U/L (ref 1–40)
BILIRUB SERPL-MCNC: 1.3 MG/DL (ref 0–1.2)
BUN SERPL-MCNC: 12 MG/DL (ref 8–23)
BUN/CREAT SERPL: 13.3 (ref 7–25)
CALCIUM SPEC-SCNC: 9.5 MG/DL (ref 8.6–10.5)
CHLORIDE SERPL-SCNC: 104 MMOL/L (ref 98–107)
CHOLEST SERPL-MCNC: 113 MG/DL (ref 0–200)
CO2 SERPL-SCNC: 28 MMOL/L (ref 22–29)
CREAT SERPL-MCNC: 0.9 MG/DL (ref 0.76–1.27)
CREAT UR-MCNC: 126.3 MG/DL
EGFRCR SERPLBLD CKD-EPI 2021: 96 ML/MIN/1.73
GLOBULIN UR ELPH-MCNC: 1.8 GM/DL
GLUCOSE SERPL-MCNC: 116 MG/DL (ref 65–99)
HBA1C MFR BLD: 7.5 % (ref 4.8–5.6)
HDLC SERPL-MCNC: 52 MG/DL (ref 40–60)
LDLC SERPL CALC-MCNC: 46 MG/DL (ref 0–100)
LDLC/HDLC SERPL: 0.89 {RATIO}
MICROALBUMIN/CREAT UR: NORMAL MG/G{CREAT}
POTASSIUM SERPL-SCNC: 4.8 MMOL/L (ref 3.5–5.2)
PROT SERPL-MCNC: 6.1 G/DL (ref 6–8.5)
SODIUM SERPL-SCNC: 141 MMOL/L (ref 136–145)
TRIGL SERPL-MCNC: 73 MG/DL (ref 0–150)
TSH SERPL DL<=0.05 MIU/L-ACNC: 1.71 UIU/ML (ref 0.27–4.2)
VLDLC SERPL-MCNC: 15 MG/DL (ref 5–40)

## 2022-08-19 PROCEDURE — 83036 HEMOGLOBIN GLYCOSYLATED A1C: CPT | Performed by: PHYSICIAN ASSISTANT

## 2022-08-19 PROCEDURE — 84443 ASSAY THYROID STIM HORMONE: CPT | Performed by: PHYSICIAN ASSISTANT

## 2022-08-19 PROCEDURE — 82570 ASSAY OF URINE CREATININE: CPT | Performed by: PHYSICIAN ASSISTANT

## 2022-08-19 PROCEDURE — 82043 UR ALBUMIN QUANTITATIVE: CPT | Performed by: PHYSICIAN ASSISTANT

## 2022-08-19 PROCEDURE — 80061 LIPID PANEL: CPT | Performed by: PHYSICIAN ASSISTANT

## 2022-08-19 PROCEDURE — 80053 COMPREHEN METABOLIC PANEL: CPT | Performed by: PHYSICIAN ASSISTANT

## 2022-08-27 DIAGNOSIS — I10 ESSENTIAL HYPERTENSION: ICD-10-CM

## 2022-08-29 RX ORDER — LOSARTAN POTASSIUM 50 MG/1
100 TABLET ORAL DAILY
Qty: 180 TABLET | Refills: 1 | OUTPATIENT
Start: 2022-08-29

## 2022-09-21 DIAGNOSIS — Z79.4 TYPE 2 DIABETES MELLITUS WITHOUT COMPLICATION, WITH LONG-TERM CURRENT USE OF INSULIN: Chronic | ICD-10-CM

## 2022-09-21 DIAGNOSIS — E11.9 TYPE 2 DIABETES MELLITUS WITHOUT COMPLICATION, WITH LONG-TERM CURRENT USE OF INSULIN: Chronic | ICD-10-CM

## 2022-09-22 RX ORDER — FLURBIPROFEN SODIUM 0.3 MG/ML
SOLUTION/ DROPS OPHTHALMIC
Qty: 100 EACH | Refills: 3 | Status: SHIPPED | OUTPATIENT
Start: 2022-09-22

## 2022-09-22 RX ORDER — INSULIN GLARGINE 300 U/ML
20 INJECTION, SOLUTION SUBCUTANEOUS
Qty: 15 ML | Refills: 1 | Status: SHIPPED | OUTPATIENT
Start: 2022-09-22 | End: 2022-11-23 | Stop reason: SDUPTHER

## 2022-11-23 ENCOUNTER — TELEMEDICINE (OUTPATIENT)
Dept: FAMILY MEDICINE CLINIC | Facility: TELEHEALTH | Age: 62
End: 2022-11-23

## 2022-11-23 DIAGNOSIS — J01.00 ACUTE MAXILLARY SINUSITIS, RECURRENCE NOT SPECIFIED: Primary | ICD-10-CM

## 2022-11-23 DIAGNOSIS — Z79.4 TYPE 2 DIABETES MELLITUS WITHOUT COMPLICATION, WITH LONG-TERM CURRENT USE OF INSULIN: Chronic | ICD-10-CM

## 2022-11-23 DIAGNOSIS — E11.9 TYPE 2 DIABETES MELLITUS WITHOUT COMPLICATION, WITH LONG-TERM CURRENT USE OF INSULIN: Chronic | ICD-10-CM

## 2022-11-23 PROCEDURE — 99213 OFFICE O/P EST LOW 20 MIN: CPT | Performed by: NURSE PRACTITIONER

## 2022-11-23 RX ORDER — PREDNISONE 20 MG/1
20 TABLET ORAL 2 TIMES DAILY
Qty: 10 TABLET | Refills: 0 | Status: SHIPPED | OUTPATIENT
Start: 2022-11-23 | End: 2022-11-28

## 2022-11-23 RX ORDER — INSULIN GLARGINE 300 U/ML
20 INJECTION, SOLUTION SUBCUTANEOUS
Qty: 6 ML | Refills: 1 | Status: SHIPPED | OUTPATIENT
Start: 2022-11-23

## 2022-11-23 RX ORDER — DOXYCYCLINE 100 MG/1
100 TABLET ORAL DAILY
COMMUNITY
Start: 2022-11-19

## 2022-11-23 NOTE — PATIENT INSTRUCTIONS
Sinusitis, Adult  Sinusitis is inflammation of your sinuses. Sinuses are hollow spaces in the bones around your face. Your sinuses are located:  Around your eyes.  In the middle of your forehead.  Behind your nose.  In your cheekbones.  Mucus normally drains out of your sinuses. When your nasal tissues become inflamed or swollen, mucus can become trapped or blocked. This allows bacteria, viruses, and fungi to grow, which leads to infection. Most infections of the sinuses are caused by a virus.  Sinusitis can develop quickly. It can last for up to 4 weeks (acute) or for more than 12 weeks (chronic). Sinusitis often develops after a cold.  What are the causes?  This condition is caused by anything that creates swelling in the sinuses or stops mucus from draining. This includes:  Allergies.  Asthma.  Infection from bacteria or viruses.  Deformities or blockages in your nose or sinuses.  Abnormal growths in the nose (nasal polyps).  Pollutants, such as chemicals or irritants in the air.  Infection from fungi (rare).  What increases the risk?  You are more likely to develop this condition if you:  Have a weak body defense system (immune system).  Do a lot of swimming or diving.  Overuse nasal sprays.  Smoke.  What are the signs or symptoms?  The main symptoms of this condition are pain and a feeling of pressure around the affected sinuses. Other symptoms include:  Stuffy nose or congestion.  Thick drainage from your nose.  Swelling and warmth over the affected sinuses.  Headache.  Upper toothache.  A cough that may get worse at night.  Extra mucus that collects in the throat or the back of the nose (postnasal drip).  Decreased sense of smell and taste.  Fatigue.  A fever.  Sore throat.  Bad breath.  How is this diagnosed?  This condition is diagnosed based on:  Your symptoms.  Your medical history.  A physical exam.  Tests to find out if your condition is acute or chronic. This may include:  Checking your nose for nasal  polyps.  Viewing your sinuses using a device that has a light (endoscope).  Testing for allergies or bacteria.  Imaging tests, such as an MRI or CT scan.  In rare cases, a bone biopsy may be done to rule out more serious types of fungal sinus disease.  How is this treated?  Treatment for sinusitis depends on the cause and whether your condition is chronic or acute.  If caused by a virus, your symptoms should go away on their own within 10 days. You may be given medicines to relieve symptoms. They include:  Medicines that shrink swollen nasal passages (topical intranasal decongestants).  Medicines that treat allergies (antihistamines).  A spray that eases inflammation of the nostrils (topical intranasal corticosteroids).  Rinses that help get rid of thick mucus in your nose (nasal saline washes).  If caused by bacteria, your health care provider may recommend waiting to see if your symptoms improve. Most bacterial infections will get better without antibiotic medicine. You may be given antibiotics if you have:  A severe infection.  A weak immune system.  If caused by narrow nasal passages or nasal polyps, you may need to have surgery.  Follow these instructions at home:  Medicines  Take, use, or apply over-the-counter and prescription medicines only as told by your health care provider. These may include nasal sprays.  If you were prescribed an antibiotic medicine, take it as told by your health care provider. Do not stop taking the antibiotic even if you start to feel better.  Hydrate and humidify    Drink enough fluid to keep your urine pale yellow. Staying hydrated will help to thin your mucus.  Use a cool mist humidifier to keep the humidity level in your home above 50%.  Inhale steam for 10-15 minutes, 3-4 times a day, or as told by your health care provider. You can do this in the bathroom while a hot shower is running.  Limit your exposure to cool or dry air.  Rest  Rest as much as possible.  Sleep with your  head raised (elevated).  Make sure you get enough sleep each night.  General instructions    Apply a warm, moist washcloth to your face 3-4 times a day or as told by your health care provider. This will help with discomfort.  Wash your hands often with soap and water to reduce your exposure to germs. If soap and water are not available, use hand .  Do not smoke. Avoid being around people who are smoking (secondhand smoke).  Keep all follow-up visits as told by your health care provider. This is important.  Contact a health care provider if:  You have a fever.  Your symptoms get worse.  Your symptoms do not improve within 10 days.  Get help right away if:  You have a severe headache.  You have persistent vomiting.  You have severe pain or swelling around your face or eyes.  You have vision problems.  You develop confusion.  Your neck is stiff.  You have trouble breathing.  Summary  Sinusitis is soreness and inflammation of your sinuses. Sinuses are hollow spaces in the bones around your face.  This condition is caused by nasal tissues that become inflamed or swollen. The swelling traps or blocks the flow of mucus. This allows bacteria, viruses, and fungi to grow, which leads to infection.  If you were prescribed an antibiotic medicine, take it as told by your health care provider. Do not stop taking the antibiotic even if you start to feel better.  Keep all follow-up visits as told by your health care provider. This is important.  This information is not intended to replace advice given to you by your health care provider. Make sure you discuss any questions you have with your health care provider.  Document Revised: 05/20/2019 Document Reviewed: 05/20/2019  [employer] Patient Education © 2022 [employer]

## 2023-01-31 DIAGNOSIS — I10 ESSENTIAL HYPERTENSION: Chronic | ICD-10-CM

## 2023-01-31 RX ORDER — AMLODIPINE BESYLATE 5 MG/1
TABLET ORAL
Qty: 90 TABLET | Refills: 1 | Status: SHIPPED | OUTPATIENT
Start: 2023-01-31 | End: 2023-03-07 | Stop reason: SDUPTHER

## 2023-01-31 RX ORDER — LOSARTAN POTASSIUM 100 MG/1
TABLET ORAL
Qty: 90 TABLET | Refills: 1 | Status: SHIPPED | OUTPATIENT
Start: 2023-01-31

## 2023-02-14 DIAGNOSIS — E78.2 MIXED HYPERLIPIDEMIA: Chronic | ICD-10-CM

## 2023-02-14 RX ORDER — ATORVASTATIN CALCIUM 40 MG/1
TABLET, FILM COATED ORAL
Qty: 90 TABLET | Refills: 1 | Status: SHIPPED | OUTPATIENT
Start: 2023-02-14 | End: 2023-03-07 | Stop reason: DRUGHIGH

## 2023-02-25 DIAGNOSIS — Z79.4 CONTROLLED TYPE 2 DIABETES MELLITUS WITHOUT COMPLICATION, WITH LONG-TERM CURRENT USE OF INSULIN: Chronic | ICD-10-CM

## 2023-02-25 DIAGNOSIS — E11.9 CONTROLLED TYPE 2 DIABETES MELLITUS WITHOUT COMPLICATION, WITH LONG-TERM CURRENT USE OF INSULIN: Chronic | ICD-10-CM

## 2023-02-27 RX ORDER — EMPAGLIFLOZIN 10 MG/1
TABLET, FILM COATED ORAL
Qty: 90 TABLET | Refills: 1 | Status: SHIPPED | OUTPATIENT
Start: 2023-02-27 | End: 2023-03-07 | Stop reason: ALTCHOICE

## 2023-03-07 ENCOUNTER — OFFICE VISIT (OUTPATIENT)
Dept: ENDOCRINOLOGY | Facility: CLINIC | Age: 63
End: 2023-03-07
Payer: COMMERCIAL

## 2023-03-07 VITALS
BODY MASS INDEX: 29.78 KG/M2 | DIASTOLIC BLOOD PRESSURE: 80 MMHG | WEIGHT: 208 LBS | SYSTOLIC BLOOD PRESSURE: 140 MMHG | HEIGHT: 70 IN | OXYGEN SATURATION: 98 % | HEART RATE: 91 BPM

## 2023-03-07 DIAGNOSIS — I10 ESSENTIAL HYPERTENSION: Chronic | ICD-10-CM

## 2023-03-07 DIAGNOSIS — E11.65 UNCONTROLLED TYPE 2 DIABETES MELLITUS WITH HYPERGLYCEMIA: Primary | Chronic | ICD-10-CM

## 2023-03-07 DIAGNOSIS — E78.2 MIXED HYPERLIPIDEMIA: Chronic | ICD-10-CM

## 2023-03-07 LAB
ALBUMIN SERPL-MCNC: 4.4 G/DL (ref 3.5–5.2)
ALBUMIN/GLOB SERPL: 1.8 G/DL
ALP SERPL-CCNC: 59 U/L (ref 39–117)
ALT SERPL W P-5'-P-CCNC: 40 U/L (ref 1–41)
ANION GAP SERPL CALCULATED.3IONS-SCNC: 5.4 MMOL/L (ref 5–15)
AST SERPL-CCNC: 25 U/L (ref 1–40)
BILIRUB SERPL-MCNC: 1.1 MG/DL (ref 0–1.2)
BUN SERPL-MCNC: 18 MG/DL (ref 8–23)
BUN/CREAT SERPL: 17.3 (ref 7–25)
CALCIUM SPEC-SCNC: 9.5 MG/DL (ref 8.6–10.5)
CHLORIDE SERPL-SCNC: 103 MMOL/L (ref 98–107)
CHOLEST SERPL-MCNC: 148 MG/DL (ref 0–200)
CO2 SERPL-SCNC: 29.6 MMOL/L (ref 22–29)
CREAT SERPL-MCNC: 1.04 MG/DL (ref 0.76–1.27)
EGFRCR SERPLBLD CKD-EPI 2021: 80.2 ML/MIN/1.73
EXPIRATION DATE: ABNORMAL
EXPIRATION DATE: NORMAL
GLOBULIN UR ELPH-MCNC: 2.5 GM/DL
GLUCOSE BLDC GLUCOMTR-MCNC: 361 MG/DL (ref 70–130)
GLUCOSE SERPL-MCNC: 262 MG/DL (ref 65–99)
HBA1C MFR BLD: 7.7 %
HDLC SERPL-MCNC: 68 MG/DL (ref 40–60)
LDLC SERPL CALC-MCNC: 65 MG/DL (ref 0–100)
LDLC/HDLC SERPL: 0.96 {RATIO}
Lab: ABNORMAL
Lab: NORMAL
POTASSIUM SERPL-SCNC: 4.4 MMOL/L (ref 3.5–5.2)
PROT SERPL-MCNC: 6.9 G/DL (ref 6–8.5)
SODIUM SERPL-SCNC: 138 MMOL/L (ref 136–145)
TRIGL SERPL-MCNC: 75 MG/DL (ref 0–150)
TSH SERPL DL<=0.05 MIU/L-ACNC: 1.05 UIU/ML (ref 0.27–4.2)
VLDLC SERPL-MCNC: 15 MG/DL (ref 5–40)

## 2023-03-07 PROCEDURE — 82947 ASSAY GLUCOSE BLOOD QUANT: CPT | Performed by: PHYSICIAN ASSISTANT

## 2023-03-07 PROCEDURE — 80053 COMPREHEN METABOLIC PANEL: CPT | Performed by: PHYSICIAN ASSISTANT

## 2023-03-07 PROCEDURE — 80061 LIPID PANEL: CPT | Performed by: PHYSICIAN ASSISTANT

## 2023-03-07 PROCEDURE — 84443 ASSAY THYROID STIM HORMONE: CPT | Performed by: PHYSICIAN ASSISTANT

## 2023-03-07 PROCEDURE — 99214 OFFICE O/P EST MOD 30 MIN: CPT | Performed by: PHYSICIAN ASSISTANT

## 2023-03-07 PROCEDURE — 83036 HEMOGLOBIN GLYCOSYLATED A1C: CPT | Performed by: PHYSICIAN ASSISTANT

## 2023-03-07 RX ORDER — DAPAGLIFLOZIN 5 MG/1
5 TABLET, FILM COATED ORAL DAILY
Qty: 90 TABLET | Refills: 1 | Status: SHIPPED | OUTPATIENT
Start: 2023-03-07

## 2023-03-07 RX ORDER — AMLODIPINE BESYLATE 10 MG/1
10 TABLET ORAL DAILY
Qty: 90 TABLET | Refills: 1 | Status: SHIPPED | OUTPATIENT
Start: 2023-03-07

## 2023-03-07 RX ORDER — ATORVASTATIN CALCIUM 20 MG/1
20 TABLET, FILM COATED ORAL DAILY
Qty: 90 TABLET | Refills: 1 | Status: SHIPPED | OUTPATIENT
Start: 2023-03-07 | End: 2024-03-06

## 2023-03-07 NOTE — PROGRESS NOTES
Chief Complaint  F/u for Diabetes Mellitus.    HPI   Sancho Salazar is a 63 y.o. male who is here today for f/u of Diabetes Mellitus type 2. The initial diagnosis of diabetes was made 2016.    He is moving to [place] in May. His family is there.    Has been under a lot of stress - has lost 2 family members recently.      Diabetic complications: none  Eye exam current (within one year): updated 8/2021    Current diabetic medications include:  Jardiance 10 mg daily - he has frequent urination with this, bothersome  Toujeo 22 u qhs.     Statin: lipitor 40 - has some myalgias with this    Past medications: metformin (severe abdominal pain)    Diabetic Monitoring  -   See above     The following portions of the patient's history were reviewed and updated by me as appropriate: allergies, current medications, past family history, past social history, past surgical history and problem list.      Past Medical History:   Diagnosis Date   • Diabetes mellitus (HCC)    • Hyperlipidemia 5-21    Labs show cholestrol slightly elevated but not real high   • Hypertension    • Type 2 diabetes mellitus (HCC) 4-20    First noted in april began treatment with Jardiance and insu       Medications    Current Outpatient Medications:   •  amLODIPine (NORVASC) 10 MG tablet, Take 1 tablet by mouth Daily. Dose increase, Disp: 90 tablet, Rfl: 1  •  doxycycline (ADOXA) 100 MG tablet, Take 1 tablet by mouth Daily., Disp: , Rfl:   •  Insulin Glargine, 1 Unit Dial, (Toujeo SoloStar) 300 UNIT/ML solution pen-injector injection, Inject 20 Units under the skin into the appropriate area as directed every night at bedtime. (Patient taking differently: Inject 22 Units under the skin into the appropriate area as directed every night at bedtime.), Disp: 6 mL, Rfl: 1  •  Insulin Pen Needle (B-D UF III MINI PEN NEEDLES) 31G X 5 MM misc, USE DAILY WITH INSULIN INJECTIONS E11.65, Disp: 100 each, Rfl: 3  •  losartan (COZAAR) 100 MG tablet, TAKE 1 TABLET BY MOUTH  "EVERY DAY, Disp: 90 tablet, Rfl: 1  •  atorvastatin (Lipitor) 20 MG tablet, Take 1 tablet by mouth Daily. Dose decrease, Disp: 90 tablet, Rfl: 1  •  dapagliflozin (Farxiga) 5 MG tablet tablet, Take 1 tablet by mouth Daily., Disp: 90 tablet, Rfl: 1    Review of Systems  Review of Systems   All other systems reviewed and are negative.       Physical Exam    /80   Pulse 91   Ht 177.8 cm (70\")   Wt 94.3 kg (208 lb)   SpO2 98%   BMI 29.84 kg/m² Body mass index is 29.84 kg/m².  Physical Exam   Constitutional: He is oriented to person, place, and time. He appears well-developed. He does not appear ill. No distress.   HENT:   Head: Normocephalic.   Right Ear: External ear normal.   Left Ear: External ear normal.   Nose: Nose normal.   Eyes: Conjunctivae are normal. Right eye exhibits no discharge. Left eye exhibits no discharge. No scleral icterus.   Neck: No JVD present. No tracheal deviation present. No thyromegaly present.   Cardiovascular: Normal rate, regular rhythm and normal heart sounds.   No murmur heard.  Pulmonary/Chest: Effort normal and breath sounds normal. No respiratory distress. He has no wheezes.   Abdominal: Normal appearance.   Musculoskeletal: No tenderness.    Sancho had a diabetic foot exam performed today.   During the foot exam he had a monofilament test performed.    Neurological Sensory Findings - Unaltered hot/cold right ankle/foot discrimination and unaltered hot/cold left ankle/foot discrimination. Unaltered sharp/dull right ankle/foot discrimination and unaltered sharp/dull left ankle/foot discrimination. No right ankle/foot altered proprioception and no left ankle/foot altered proprioception  Vascular Status -  His right foot exhibits normal foot vasculature  and no edema. His left foot exhibits normal foot vasculature  and no edema.  Skin Integrity  -  His right foot skin is intact.  He has no right foot onychomycosis, no right foot ulcer and non-callous right foot.His left foot " skin is intact. He has no left foot onychomycosis, no left foot ulcer and non-callous left foot..  Neurological: He is alert and oriented to person, place, and time.   Skin: Skin is warm and dry. No rash noted. He is not diaphoretic. No erythema.   Psychiatric: His behavior is normal. Mood, judgment and thought content normal.       Labs and Imaging   Lab Results   Component Value Date    HGBA1C 7.7 03/07/2023    HGBA1C 7.50 (H) 08/19/2022    HGBA1C 7.2 03/11/2022     Office Visit on 03/07/2023   Component Date Value Ref Range Status   • Hemoglobin A1C 03/07/2023 7.7  % Final   • Lot Number 03/07/2023 [id number]   Final   • Expiration Date 03/07/2023 07-   Final   • Glucose 03/07/2023 361 (A)  70 - 130 mg/dL Final   • Lot Number 03/07/2023 [id number]   Final   • Expiration Date 03/07/2023 10-   Final     Labs from 8/19/2022:  UACR unable to calculate (microalbumin <1.2)  Cr 0.9, GFR 96, ALT 40, AST 22, LDL 46, triglycerides 73, TSH 1.71    Assessment / Plan   Diagnoses and all orders for this visit:    1. Uncontrolled type 2 diabetes mellitus with hyperglycemia (HCC) (Primary)  -     POC Glycosylated Hemoglobin (Hb A1C)  -     POC Glucose, Blood  -     dapagliflozin (Farxiga) 5 MG tablet tablet; Take 1 tablet by mouth Daily.  Dispense: 90 tablet; Refill: 1  -     Comprehensive Metabolic Panel  -     Lipid Panel  -     TSH    2. Mixed hyperlipidemia  -     Comprehensive Metabolic Panel  -     Lipid Panel  -     atorvastatin (Lipitor) 20 MG tablet; Take 1 tablet by mouth Daily. Dose decrease  Dispense: 90 tablet; Refill: 1    3. Essential hypertension  -     amLODIPine (NORVASC) 10 MG tablet; Take 1 tablet by mouth Daily. Dose increase  Dispense: 90 tablet; Refill: 1      Diabetes Mellitus 2 is under fair control.  -a1c 7.7  - during visit  -continue toujeo 22 u qhs   -change jardiance to farxiga 5 mg daily to see if he tolerates better  -we discussed benefit of switching from toujeo to weekly GLP-1  agonist and he has declined in the past. We talked about it again today and recommended this switch, but would wait until he establishes care with someone in [place] since I am likely not going to see him in f/u before he moves  -eye exam due, labs ordered, UACR due 8/2023, foot exam updated today    Hyperlipidemia  -repeat lipids  -decrease lipitor to 20 mg due to myalgias    HTN  -averaging 130s systolic   -increase amlodipine to 10 mg daily, continue losartan 100 mg daily    There are no Patient Instructions on file for this visit.    Follow up: No follow-ups on file.      Javier Jewell PA-C

## 2023-03-08 ENCOUNTER — PRIOR AUTHORIZATION (OUTPATIENT)
Dept: ENDOCRINOLOGY | Facility: CLINIC | Age: 63
End: 2023-03-08
Payer: COMMERCIAL

## 2023-04-09 DIAGNOSIS — Z79.4 TYPE 2 DIABETES MELLITUS WITHOUT COMPLICATION, WITH LONG-TERM CURRENT USE OF INSULIN: Chronic | ICD-10-CM

## 2023-04-09 DIAGNOSIS — E11.9 TYPE 2 DIABETES MELLITUS WITHOUT COMPLICATION, WITH LONG-TERM CURRENT USE OF INSULIN: Chronic | ICD-10-CM

## 2023-04-10 RX ORDER — INSULIN GLARGINE 300 U/ML
22 INJECTION, SOLUTION SUBCUTANEOUS
Qty: 7 ML | Refills: 1 | Status: SHIPPED | OUTPATIENT
Start: 2023-04-10

## 2023-04-10 RX ORDER — FLURBIPROFEN SODIUM 0.3 MG/ML
SOLUTION/ DROPS OPHTHALMIC
Qty: 100 EACH | Refills: 3 | Status: SHIPPED | OUTPATIENT
Start: 2023-04-10

## 2023-05-01 DIAGNOSIS — E11.65 UNCONTROLLED TYPE 2 DIABETES MELLITUS WITH HYPERGLYCEMIA: Chronic | ICD-10-CM

## 2023-05-01 DIAGNOSIS — E11.9 TYPE 2 DIABETES MELLITUS WITHOUT COMPLICATION, WITH LONG-TERM CURRENT USE OF INSULIN: Chronic | ICD-10-CM

## 2023-05-01 DIAGNOSIS — I10 ESSENTIAL HYPERTENSION: Chronic | ICD-10-CM

## 2023-05-01 DIAGNOSIS — E78.2 MIXED HYPERLIPIDEMIA: Chronic | ICD-10-CM

## 2023-05-01 DIAGNOSIS — Z79.4 TYPE 2 DIABETES MELLITUS WITHOUT COMPLICATION, WITH LONG-TERM CURRENT USE OF INSULIN: Chronic | ICD-10-CM

## 2023-05-01 RX ORDER — ATORVASTATIN CALCIUM 20 MG/1
20 TABLET, FILM COATED ORAL DAILY
Qty: 90 TABLET | Refills: 1 | Status: SHIPPED | OUTPATIENT
Start: 2023-05-01 | End: 2024-04-30

## 2023-05-01 RX ORDER — FLURBIPROFEN SODIUM 0.3 MG/ML
SOLUTION/ DROPS OPHTHALMIC
Qty: 100 EACH | Refills: 3 | Status: SHIPPED | OUTPATIENT
Start: 2023-05-01

## 2023-05-01 RX ORDER — INSULIN GLARGINE 300 U/ML
22 INJECTION, SOLUTION SUBCUTANEOUS
Qty: 7 ML | Refills: 1 | Status: SHIPPED | OUTPATIENT
Start: 2023-05-01

## 2023-05-01 RX ORDER — LOSARTAN POTASSIUM 100 MG/1
100 TABLET ORAL DAILY
Qty: 90 TABLET | Refills: 1 | Status: SHIPPED | OUTPATIENT
Start: 2023-05-01

## 2023-05-01 RX ORDER — AMLODIPINE BESYLATE 10 MG/1
10 TABLET ORAL DAILY
Qty: 90 TABLET | Refills: 1 | Status: SHIPPED | OUTPATIENT
Start: 2023-05-01

## 2023-05-01 RX ORDER — DAPAGLIFLOZIN 5 MG/1
5 TABLET, FILM COATED ORAL DAILY
Qty: 90 TABLET | Refills: 1 | Status: SHIPPED | OUTPATIENT
Start: 2023-05-01

## 2023-05-02 DIAGNOSIS — E11.9 TYPE 2 DIABETES MELLITUS WITHOUT COMPLICATION, WITH LONG-TERM CURRENT USE OF INSULIN: Chronic | ICD-10-CM

## 2023-05-02 DIAGNOSIS — I10 ESSENTIAL HYPERTENSION: Chronic | ICD-10-CM

## 2023-05-02 DIAGNOSIS — E11.65 UNCONTROLLED TYPE 2 DIABETES MELLITUS WITH HYPERGLYCEMIA: Chronic | ICD-10-CM

## 2023-05-02 DIAGNOSIS — Z79.4 TYPE 2 DIABETES MELLITUS WITHOUT COMPLICATION, WITH LONG-TERM CURRENT USE OF INSULIN: Chronic | ICD-10-CM

## 2023-05-02 DIAGNOSIS — E78.2 MIXED HYPERLIPIDEMIA: Chronic | ICD-10-CM

## 2023-05-03 RX ORDER — AMLODIPINE BESYLATE 10 MG/1
10 TABLET ORAL DAILY
Qty: 90 TABLET | Refills: 1 | OUTPATIENT
Start: 2023-05-03

## 2023-05-03 RX ORDER — LOSARTAN POTASSIUM 100 MG/1
100 TABLET ORAL DAILY
Qty: 90 TABLET | Refills: 1 | OUTPATIENT
Start: 2023-05-03

## 2023-05-03 RX ORDER — INSULIN GLARGINE 300 U/ML
22 INJECTION, SOLUTION SUBCUTANEOUS
Qty: 7 ML | Refills: 1 | OUTPATIENT
Start: 2023-05-03

## 2023-05-03 RX ORDER — ATORVASTATIN CALCIUM 20 MG/1
20 TABLET, FILM COATED ORAL DAILY
Qty: 90 TABLET | Refills: 1 | OUTPATIENT
Start: 2023-05-03 | End: 2024-05-02

## 2023-05-03 RX ORDER — DAPAGLIFLOZIN 5 MG/1
5 TABLET, FILM COATED ORAL DAILY
Qty: 90 TABLET | Refills: 1 | OUTPATIENT
Start: 2023-05-03

## 2023-05-03 RX ORDER — FLURBIPROFEN SODIUM 0.3 MG/ML
SOLUTION/ DROPS OPHTHALMIC
Qty: 100 EACH | Refills: 3 | OUTPATIENT
Start: 2023-05-03

## 2023-05-03 NOTE — TELEPHONE ENCOUNTER
Rx Refill Note  Requested Prescriptions     Pending Prescriptions Disp Refills   • Insulin Pen Needle (B-D UF III MINI PEN NEEDLES) 31G X 5 MM misc 100 each 3     Sig: USE DAILY WITH INSULIN INJECTIONS E11.65          Last office visit with prescribing clinician: Visit date not found     Next office visit with prescribing clinician: Visit date not found         Ninoska Serna MA  05/03/23, 12:19 EDT